# Patient Record
Sex: FEMALE | Race: WHITE | NOT HISPANIC OR LATINO | Employment: OTHER | URBAN - METROPOLITAN AREA
[De-identification: names, ages, dates, MRNs, and addresses within clinical notes are randomized per-mention and may not be internally consistent; named-entity substitution may affect disease eponyms.]

---

## 2020-09-28 ENCOUNTER — OFFICE VISIT (OUTPATIENT)
Dept: URGENT CARE | Facility: CLINIC | Age: 55
End: 2020-09-28
Payer: MEDICARE

## 2020-09-28 VITALS
RESPIRATION RATE: 16 BRPM | HEIGHT: 63 IN | WEIGHT: 159 LBS | HEART RATE: 88 BPM | BODY MASS INDEX: 28.17 KG/M2 | OXYGEN SATURATION: 97 % | DIASTOLIC BLOOD PRESSURE: 80 MMHG | SYSTOLIC BLOOD PRESSURE: 137 MMHG | TEMPERATURE: 97 F

## 2020-09-28 DIAGNOSIS — R51.9 NONINTRACTABLE HEADACHE, UNSPECIFIED CHRONICITY PATTERN, UNSPECIFIED HEADACHE TYPE: Primary | ICD-10-CM

## 2020-09-28 DIAGNOSIS — R11.0 NAUSEA: ICD-10-CM

## 2020-09-28 PROCEDURE — 99203 OFFICE O/P NEW LOW 30 MIN: CPT | Mod: 25,S$GLB,, | Performed by: NURSE PRACTITIONER

## 2020-09-28 PROCEDURE — 99203 PR OFFICE/OUTPT VISIT, NEW, LEVL III, 30-44 MIN: ICD-10-PCS | Mod: 25,S$GLB,, | Performed by: NURSE PRACTITIONER

## 2020-09-28 PROCEDURE — 96372 THER/PROPH/DIAG INJ SC/IM: CPT | Mod: S$GLB,,, | Performed by: FAMILY MEDICINE

## 2020-09-28 PROCEDURE — 96372 PR INJECTION,THERAP/PROPH/DIAG2ST, IM OR SUBCUT: ICD-10-PCS | Mod: S$GLB,,, | Performed by: FAMILY MEDICINE

## 2020-09-28 RX ORDER — ONDANSETRON 4 MG/1
4 TABLET, ORALLY DISINTEGRATING ORAL EVERY 6 HOURS PRN
Qty: 15 TABLET | Refills: 0 | Status: SHIPPED | OUTPATIENT
Start: 2020-09-28 | End: 2023-12-26 | Stop reason: CLARIF

## 2020-09-28 RX ORDER — KETOROLAC TROMETHAMINE 30 MG/ML
30 INJECTION, SOLUTION INTRAMUSCULAR; INTRAVENOUS
Status: COMPLETED | OUTPATIENT
Start: 2020-09-28 | End: 2020-09-28

## 2020-09-28 RX ORDER — ESCITALOPRAM OXALATE 10 MG/1
TABLET ORAL
COMMUNITY
Start: 2020-09-08 | End: 2023-09-26

## 2020-09-28 RX ORDER — TOPIRAMATE 50 MG/1
50 TABLET, FILM COATED ORAL 2 TIMES DAILY
COMMUNITY

## 2020-09-28 RX ORDER — ROSUVASTATIN CALCIUM 5 MG/1
5 TABLET, COATED ORAL
COMMUNITY

## 2020-09-28 RX ORDER — BUSPIRONE HYDROCHLORIDE 15 MG/1
TABLET ORAL
COMMUNITY
End: 2023-09-12

## 2020-09-28 RX ORDER — RIZATRIPTAN BENZOATE 10 MG/1
10 TABLET ORAL DAILY PRN
COMMUNITY
End: 2024-02-26

## 2020-09-28 RX ADMIN — KETOROLAC TROMETHAMINE 30 MG: 30 INJECTION, SOLUTION INTRAMUSCULAR; INTRAVENOUS at 08:09

## 2020-09-29 NOTE — PATIENT INSTRUCTIONS
"DO NOT TAKE ANY MORE NAPROXEN OR IBUPROFEN FOR 24 HOURS AS YOU RECEIVED A LARGE DOSE OF IT VIA INJECTION    PLEASE READ YOUR DISCHARGE INSTRUCTIONS ENTIRELY AS IT CONTAINS IMPORTANT INFORMATION.    Use the zofran as needed for nausea- it dissolves under your tongue.     Please go to the emergency room if you experience chest pain, shortness of breath, funny heart beats, headache, blurred vision, weakness in one arm or leg, slurred speech, numbness, inability to walk or talk, confusion.     Try to avoid common triggers of headaches (stress, menstruation, visual stimuli, weather changes, nitrates, fasting, wine, lack of sleep, smoking, odors, chocolate)       Please return or see your primary care doctor if you develop new or worsening symptoms.       Please arrange follow up with your primary medical clinic as soon as possible. You must understand that you've received an Urgent Care treatment only and that you may be released before all of your medical problems are known or treated. You, the patient, will arrange for follow up as instructed. If your symptoms worsen or fail to improve you should go to the Emergency Room.  WE CANNOT RULE OUT ALL POSSIBLE CAUSES OF YOUR SYMPTOMS IN THE URGENT CARE SETTING PLEASE GO TO THE ER IF YOU FEELS YOUR CONDITION IS WORSENING OR YOU WOULD LIKE EMERGENT EVALUATION.            Headache, Unspecified    A number of things can cause headaches. The cause of your headache isnt clear. But it doesnt seem to be a sign of any serious illness.  You could have a tension headache or a migraine headache.  Stress can cause a tension headache. This can happen if you tense the muscles of your shoulders, neck, and scalp without knowing it. If this stress lasts long enough, you may develop a tension headache.  It is not clear why migraines occur, but certain things called" triggers" can raise the risk of having a migraine attack. Migraine triggers may include emotional stress or depression, or by " hormone changes during the menstrual cycle. Other triggers include birth control pills and other medicines, alcohol or caffeine, foods with tyramine (such as aged cheese, wine), eyestrain, weather changes, missed meals, and lack of sleep or oversleeping.  Other causes of headache include:  · Viral illness with high fever  · Head injury with concussion  · Sinus, ear, or throat infection  · Dental pain and jaw joint (TMJ) pain  More serious but less common causes of headache include stroke, brain hemorrhage, brain tumor, meningitis, and encephalitis.  Home care  Follow these tips when taking care of yourself at home:  · Dont drive yourself home if you were given pain medicine for your headache. Instead, have someone else drive you home. Try to sleep when you get home. You should feel much better when you wake up.  · Apply heat to the back of your neck to ease a neck muscle spasm. Take care of a migraine headache by putting an ice pack on your forehead or at the base of your skull.  · If you have nausea or vomiting, eat a light diet until your headache eases.  · If you have a migraine headache, use sunglasses when in the daylight or around bright indoor lighting until your symptoms get better. Bright glaring light can make this type of headache worse.  Follow-up care  Follow up with your healthcare provider, or as advised. Talk with your provider if you have frequent headaches. He or she can help figure out a treatment plan. By knowing the earliest signs of headache, and starting treatment right away, you may be able to stop the pain yourself.  When to seek medical advice  Call your healthcare provider right away if any of these occur:  · Your head pain suddenly gets worse after sexual intercourse or strenuous activity  · Your head pain doesnt get better within 24 hours  · You arent able to keep liquids down (repeated vomiting)  · Fever of 100.4ºF (38ºC) or higher, or as directed by your healthcare provider  · Stiff  neck  · Extreme drowsiness, confusion, or fainting  · Dizziness or dizziness with spinning sensation (vertigo)  · Weakness in an arm or leg or one side of your face  · You have trouble talking or seeing  Date Last Reviewed: 8/1/2016  © 3036-5240 [employer]. [address] [place] [address]. All rights reserved. This information is not intended as a substitute for professional medical care. Always follow your healthcare professional's instructions.

## 2020-09-29 NOTE — PROGRESS NOTES
"Subjective:       Patient ID: Rebeca Martinez is a 55 y.o. female.    Vitals:  height is 5' 3" (1.6 m) and weight is 72.1 kg (159 lb). Her temperature is 97.2 °F (36.2 °C). Her blood pressure is 137/80 and her pulse is 88. Her respiration is 16 and oxygen saturation is 97%.     Chief Complaint: Migraine    This is a 55 y.o. female who presents today with a chief complaint of  migraine headache since this AM, patient here with mom, mom reports she give her regular migraine medication at 5:00 p.m. with no relief, mom reports usually after she takes her migraine medication it resolves her headache, denies any other symptoms, mom denies any pertinent medical history for patient, denies cardiac, or renal history, mom reports her primary at , mom/dad reports she used to follow-up with the neurology and they told her not to give her COVID but lately she has been drinking some Coke, mom reports nausea and requesting nausea medicine, denies vomiting, denies abdominal pain, denies chest pain or exertional chest pain, mom denies dizziness or positional lightheadedness, mom reports usual migraine headache, denies worst headache, mom does not remember exact name of the migraine medication, upon chart review Topamax listed            Migraine   This is a new problem. The current episode started today. The problem occurs constantly. The problem has been gradually worsening. The pain is severe. Associated symptoms include nausea. Pertinent negatives include no blurred vision, coughing, dizziness, fever, sore throat or weakness. Treatments tried: migraine meds. The treatment provided no relief.       Constitution: Negative for chills, fatigue and fever.   HENT: Negative for congestion and sore throat.    Neck: Negative for painful lymph nodes.   Cardiovascular: Negative for chest pain and leg swelling.   Eyes: Negative for double vision and blurred vision.   Respiratory: Negative for cough and shortness of breath.  "   Gastrointestinal: Positive for nausea. Negative for diarrhea.   Genitourinary: Negative for dysuria, frequency, urgency and history of kidney stones.   Musculoskeletal: Negative for joint pain, joint swelling, muscle cramps and muscle ache.   Skin: Negative for color change, pale, rash and bruising.   Allergic/Immunologic: Negative for seasonal allergies.   Neurological: Positive for headaches. Negative for dizziness, history of vertigo, light-headedness and passing out.   Hematologic/Lymphatic: Negative for swollen lymph nodes.   Psychiatric/Behavioral: Negative for nervous/anxious, sleep disturbance and depression. The patient is not nervous/anxious.        Objective:      Physical Exam   Constitutional: She is oriented to person, place, and time. She appears well-developed.  Non-toxic appearance. She does not appear ill. No distress.   HENT:   Head: Normocephalic and atraumatic.   Ears:   Right Ear: Hearing, tympanic membrane, external ear and ear canal normal.   Left Ear: Hearing, tympanic membrane, external ear and ear canal normal.   Nose: Nose normal. No mucosal edema, rhinorrhea or nasal deformity. No epistaxis. Right sinus exhibits no maxillary sinus tenderness and no frontal sinus tenderness. Left sinus exhibits no maxillary sinus tenderness and no frontal sinus tenderness.   Mouth/Throat: Uvula is midline, oropharynx is clear and moist and mucous membranes are normal. No trismus in the jaw. Normal dentition. No uvula swelling. No posterior oropharyngeal erythema.   No temp TTP      Comments: No temp TTP  Eyes: Pupils are equal, round, and reactive to light. Conjunctivae, EOM and lids are normal. No scleral icterus.   Neck: Trachea normal, normal range of motion, full passive range of motion without pain and phonation normal. Neck supple. No neck rigidity.   Cardiovascular: Normal rate, regular rhythm, normal heart sounds and normal pulses.   Pulmonary/Chest: Effort normal and breath sounds normal. No  respiratory distress.   Abdominal: Soft. Normal appearance and bowel sounds are normal. She exhibits no distension. There is no abdominal tenderness.   Musculoskeletal: Normal range of motion.         General: No deformity.   Neurological: She is alert and oriented to person, place, and time. She has intact cranial nerves. No cranial nerve deficit. She exhibits normal muscle tone. Coordination normal.   Skin: Skin is warm, dry, intact, not diaphoretic and not pale. Psychiatric: Her speech is normal and behavior is normal. Judgment and thought content normal.   Nursing note and vitals reviewed.          Patient in no acute distress and nontoxic appearing.  Vitals reassuring.  Detailed education provided to parents regarding  headaches. Side effects of Toradol discussed in detail. Strict ER precautions given.    Discussed results/diagnosis/plan in depth with patient in clinic. Strict precautions given to patient to monitor for worsening signs and symptoms. Advised to follow up with primary.All questions answered. Strict ER precautions given. If your symptoms worsens of fail to improve you should go to the Emergency Room. Discharge and follow-up instructions given verbally/printed. Patient voiced understanding and in agreement with current treatment plan.        Assessment:       1. Nonintractable headache, unspecified chronicity pattern, unspecified headache type    2. Nausea        Plan:         Nonintractable headache, unspecified chronicity pattern, unspecified headache type  -     ketorolac injection 30 mg    Nausea  -     ondansetron (ZOFRAN-ODT) 4 MG TbDL; Take 1 tablet (4 mg total) by mouth every 6 (six) hours as needed.  Dispense: 15 tablet; Refill: 0      Patient Instructions   DO NOT TAKE ANY MORE NAPROXEN OR IBUPROFEN FOR 24 HOURS AS YOU RECEIVED A LARGE DOSE OF IT VIA INJECTION    PLEASE READ YOUR DISCHARGE INSTRUCTIONS ENTIRELY AS IT CONTAINS IMPORTANT INFORMATION.    Use the zofran as needed for nausea-  "it dissolves under your tongue.     Please go to the emergency room if you experience chest pain, shortness of breath, funny heart beats, headache, blurred vision, weakness in one arm or leg, slurred speech, numbness, inability to walk or talk, confusion.     Try to avoid common triggers of headaches (stress, menstruation, visual stimuli, weather changes, nitrates, fasting, wine, lack of sleep, smoking, odors, chocolate)       Please return or see your primary care doctor if you develop new or worsening symptoms.       Please arrange follow up with your primary medical clinic as soon as possible. You must understand that you've received an Urgent Care treatment only and that you may be released before all of your medical problems are known or treated. You, the patient, will arrange for follow up as instructed. If your symptoms worsen or fail to improve you should go to the Emergency Room.  WE CANNOT RULE OUT ALL POSSIBLE CAUSES OF YOUR SYMPTOMS IN THE URGENT CARE SETTING PLEASE GO TO THE ER IF YOU FEELS YOUR CONDITION IS WORSENING OR YOU WOULD LIKE EMERGENT EVALUATION.            Headache, Unspecified    A number of things can cause headaches. The cause of your headache isnt clear. But it doesnt seem to be a sign of any serious illness.  You could have a tension headache or a migraine headache.  Stress can cause a tension headache. This can happen if you tense the muscles of your shoulders, neck, and scalp without knowing it. If this stress lasts long enough, you may develop a tension headache.  It is not clear why migraines occur, but certain things called" triggers" can raise the risk of having a migraine attack. Migraine triggers may include emotional stress or depression, or by hormone changes during the menstrual cycle. Other triggers include birth control pills and other medicines, alcohol or caffeine, foods with tyramine (such as aged cheese, wine), eyestrain, weather changes, missed meals, and lack of " sleep or oversleeping.  Other causes of headache include:  · Viral illness with high fever  · Head injury with concussion  · Sinus, ear, or throat infection  · Dental pain and jaw joint (TMJ) pain  More serious but less common causes of headache include stroke, brain hemorrhage, brain tumor, meningitis, and encephalitis.  Home care  Follow these tips when taking care of yourself at home:  · Dont drive yourself home if you were given pain medicine for your headache. Instead, have someone else drive you home. Try to sleep when you get home. You should feel much better when you wake up.  · Apply heat to the back of your neck to ease a neck muscle spasm. Take care of a migraine headache by putting an ice pack on your forehead or at the base of your skull.  · If you have nausea or vomiting, eat a light diet until your headache eases.  · If you have a migraine headache, use sunglasses when in the daylight or around bright indoor lighting until your symptoms get better. Bright glaring light can make this type of headache worse.  Follow-up care  Follow up with your healthcare provider, or as advised. Talk with your provider if you have frequent headaches. He or she can help figure out a treatment plan. By knowing the earliest signs of headache, and starting treatment right away, you may be able to stop the pain yourself.  When to seek medical advice  Call your healthcare provider right away if any of these occur:  · Your head pain suddenly gets worse after sexual intercourse or strenuous activity  · Your head pain doesnt get better within 24 hours  · You arent able to keep liquids down (repeated vomiting)  · Fever of 100.4ºF (38ºC) or higher, or as directed by your healthcare provider  · Stiff neck  · Extreme drowsiness, confusion, or fainting  · Dizziness or dizziness with spinning sensation (vertigo)  · Weakness in an arm or leg or one side of your face  · You have trouble talking or seeing  Date Last Reviewed:  8/1/2016  © 7661-6353 [employer], [place]. [address] [place] [address], [place] [address]. All rights reserved. This information is not intended as a substitute for professional medical care. Always follow your healthcare professional's instructions.

## 2020-11-16 ENCOUNTER — OFFICE VISIT (OUTPATIENT)
Dept: URGENT CARE | Facility: CLINIC | Age: 55
End: 2020-11-16
Payer: MEDICARE

## 2020-11-16 VITALS
TEMPERATURE: 97 F | BODY MASS INDEX: 29.23 KG/M2 | WEIGHT: 165 LBS | HEIGHT: 63 IN | DIASTOLIC BLOOD PRESSURE: 89 MMHG | OXYGEN SATURATION: 98 % | SYSTOLIC BLOOD PRESSURE: 148 MMHG | HEART RATE: 64 BPM

## 2020-11-16 DIAGNOSIS — R51.9 NONINTRACTABLE HEADACHE, UNSPECIFIED CHRONICITY PATTERN, UNSPECIFIED HEADACHE TYPE: Primary | ICD-10-CM

## 2020-11-16 PROCEDURE — 99213 PR OFFICE/OUTPT VISIT, EST, LEVL III, 20-29 MIN: ICD-10-PCS | Mod: 25,S$GLB,, | Performed by: NURSE PRACTITIONER

## 2020-11-16 PROCEDURE — 96372 THER/PROPH/DIAG INJ SC/IM: CPT | Mod: S$GLB,,, | Performed by: NURSE PRACTITIONER

## 2020-11-16 PROCEDURE — 99213 OFFICE O/P EST LOW 20 MIN: CPT | Mod: 25,S$GLB,, | Performed by: NURSE PRACTITIONER

## 2020-11-16 PROCEDURE — 96372 PR INJECTION,THERAP/PROPH/DIAG2ST, IM OR SUBCUT: ICD-10-PCS | Mod: S$GLB,,, | Performed by: NURSE PRACTITIONER

## 2020-11-16 RX ORDER — KETOROLAC TROMETHAMINE 30 MG/ML
30 INJECTION, SOLUTION INTRAMUSCULAR; INTRAVENOUS
Status: COMPLETED | OUTPATIENT
Start: 2020-11-16 | End: 2020-11-16

## 2020-11-16 RX ADMIN — KETOROLAC TROMETHAMINE 30 MG: 30 INJECTION, SOLUTION INTRAMUSCULAR; INTRAVENOUS at 05:11

## 2020-11-16 NOTE — PROGRESS NOTES
"Subjective:       Patient ID: Rebeca Martinez is a 55 y.o. female.    Vitals:  height is 5' 3" (1.6 m) and weight is 74.8 kg (165 lb). Her temperature is 97.4 °F (36.3 °C). Her blood pressure is 148/89 (abnormal) and her pulse is 64. Her oxygen saturation is 98%.     Chief Complaint: Migraine    Pt states migraine beginning on 11/15.     Migraine   This is a new problem. The current episode started today. The problem occurs constantly. The problem has been gradually worsening. The pain is located in the bilateral region. The pain does not radiate. The pain quality is similar to prior headaches. The quality of the pain is described as stabbing, shooting and pulsating. The pain is at a severity of 7/10. Pertinent negatives include no abdominal pain, abnormal behavior, anorexia, back pain, blurred vision, coughing, dizziness, drainage, ear pain, eye pain, eye redness, eye watering, facial sweating, fever, hearing loss, insomnia, loss of balance, muscle aches, nausea, neck pain, numbness, phonophobia, photophobia, rhinorrhea, scalp tenderness, seizures, sinus pressure, sore throat, swollen glands, tingling, tinnitus, visual change, vomiting, weakness or weight loss. The symptoms are aggravated by bright light. She has tried NSAIDs for the symptoms. The treatment provided no relief. There is no history of cancer, cluster headaches, hypertension, immunosuppression, migraine headaches, migraines in the family, obesity, pseudotumor cerebri, recent head traumas, sinus disease or TMJ.       Constitution: Negative for chills, sweating and fever.   HENT: Negative for ear pain, tinnitus, hearing loss, facial swelling, congestion, sinus pain, sinus pressure and sore throat.    Neck: Negative for neck pain and neck stiffness.   Eyes: Negative for eye pain, eye redness, photophobia, vision loss, double vision and blurred vision.   Respiratory: Negative for cough.    Gastrointestinal: Negative for abdominal pain, nausea and " vomiting.   Genitourinary: Negative for missed menses.   Musculoskeletal: Negative for trauma, back pain and muscle ache.   Skin: Negative for rash, wound and lesion.   Neurological: Positive for headaches. Negative for dizziness, history of vertigo, light-headedness, facial drooping, speech difficulty, coordination disturbances, loss of balance, history of migraines, disorientation, loss of consciousness, numbness and seizures.   Psychiatric/Behavioral: Negative for disorientation, confusion, nervous/anxious, sleep disturbance and depression. The patient is not nervous/anxious and does not have insomnia.        Objective:      Physical Exam   Constitutional: normal  HENT:   Head: Normocephalic.   Ears:   Right Ear: External ear normal.   Left Ear: External ear normal.   Mouth/Throat:      Comments: Oropharyngeal exam not performed due to risk of viral transmission during global pandemic-- risks outweigh benefits of exam  No temporal tenderness      Comments: No temporal tenderness  Pulmonary/Chest: Effort normal.   Abdominal: Normal appearance.   Neurological: She is alert.   Nursing note and vitals reviewed.        Assessment:       1. Nonintractable headache, unspecified chronicity pattern, unspecified headache type        Plan:         Nonintractable headache, unspecified chronicity pattern, unspecified headache type  -     ketorolac injection 30 mg  -     Ambulatory referral/consult to Neurology         Patient Instructions                                                       Headache   If your condition worsens or fails to improve we recommend that you receive another evaluation at the ER immediately or contact your PCP to discuss your concerns or return here. You must understand that you've received an urgent care treatment only and that you may be released before all your medical problems are known or treated. You the patient will arrange for follouwp care as instructed.   If you were given narcotic  prescriptions or muscle relaxants do not operate heavy equipment or machinery while taking these medications   Get rest and drink fluids. Get plenty of rest.  Watch for increase pain, fever, vomiting, neck pain or mental confusion.   You can also use tylenol or ibuprofen as directed as long as you don't have any allergies to these medications or medical conditions such as ulcers, liver or kidney disease or blood thinners etc that would prevent you from taking these meds.     You were given Toradol 30mg IM injection on today.  DO NOT START TAKING YOUR NAPROSYN until tomorrow,   Since you were given a prescription NSAID here do not also take any over the counter NSAID like ibuprofen, aleve, advil, motrin etc

## 2020-11-16 NOTE — PATIENT INSTRUCTIONS
Headache   If your condition worsens or fails to improve we recommend that you receive another evaluation at the ER immediately or contact your PCP to discuss your concerns or return here. You must understand that you've received an urgent care treatment only and that you may be released before all your medical problems are known or treated. You the patient will arrange for follouwp care as instructed.   If you were given narcotic prescriptions or muscle relaxants do not operate heavy equipment or machinery while taking these medications   Get rest and drink fluids. Get plenty of rest.  Watch for increase pain, fever, vomiting, neck pain or mental confusion.   You can also use tylenol or ibuprofen as directed as long as you don't have any allergies to these medications or medical conditions such as ulcers, liver or kidney disease or blood thinners etc that would prevent you from taking these meds.     You were given Toradol 30mg IM injection on today.  DO NOT START TAKING YOUR NAPROSYN until tomorrow,   Since you were given a prescription NSAID here do not also take any over the counter NSAID like ibuprofen, aleve, advil, motrin etc

## 2020-11-25 ENCOUNTER — TELEPHONE (OUTPATIENT)
Dept: NEUROLOGY | Facility: CLINIC | Age: 55
End: 2020-11-25

## 2021-04-20 ENCOUNTER — OFFICE VISIT (OUTPATIENT)
Dept: URGENT CARE | Facility: CLINIC | Age: 56
End: 2021-04-20
Payer: MEDICARE

## 2021-04-20 VITALS
OXYGEN SATURATION: 98 % | RESPIRATION RATE: 14 BRPM | BODY MASS INDEX: 29.23 KG/M2 | HEIGHT: 63 IN | HEART RATE: 76 BPM | WEIGHT: 165 LBS | SYSTOLIC BLOOD PRESSURE: 136 MMHG | DIASTOLIC BLOOD PRESSURE: 85 MMHG | TEMPERATURE: 97 F

## 2021-04-20 DIAGNOSIS — G43.901 MIGRAINE WITH STATUS MIGRAINOSUS, NOT INTRACTABLE, UNSPECIFIED MIGRAINE TYPE: Primary | ICD-10-CM

## 2021-04-20 PROCEDURE — 96372 PR INJECTION,THERAP/PROPH/DIAG2ST, IM OR SUBCUT: ICD-10-PCS | Mod: S$GLB,,, | Performed by: STUDENT IN AN ORGANIZED HEALTH CARE EDUCATION/TRAINING PROGRAM

## 2021-04-20 PROCEDURE — 99214 OFFICE O/P EST MOD 30 MIN: CPT | Mod: 25,S$GLB,, | Performed by: STUDENT IN AN ORGANIZED HEALTH CARE EDUCATION/TRAINING PROGRAM

## 2021-04-20 PROCEDURE — 99214 PR OFFICE/OUTPT VISIT, EST, LEVL IV, 30-39 MIN: ICD-10-PCS | Mod: 25,S$GLB,, | Performed by: STUDENT IN AN ORGANIZED HEALTH CARE EDUCATION/TRAINING PROGRAM

## 2021-04-20 PROCEDURE — 96372 THER/PROPH/DIAG INJ SC/IM: CPT | Mod: S$GLB,,, | Performed by: STUDENT IN AN ORGANIZED HEALTH CARE EDUCATION/TRAINING PROGRAM

## 2021-04-20 RX ORDER — ONDANSETRON 4 MG/1
4 TABLET, ORALLY DISINTEGRATING ORAL
Status: COMPLETED | OUTPATIENT
Start: 2021-04-20 | End: 2021-04-20

## 2021-04-20 RX ORDER — KETOROLAC TROMETHAMINE 30 MG/ML
30 INJECTION, SOLUTION INTRAMUSCULAR; INTRAVENOUS
Status: COMPLETED | OUTPATIENT
Start: 2021-04-20 | End: 2021-04-20

## 2021-04-20 RX ADMIN — KETOROLAC TROMETHAMINE 30 MG: 30 INJECTION, SOLUTION INTRAMUSCULAR; INTRAVENOUS at 01:04

## 2021-04-20 RX ADMIN — ONDANSETRON 4 MG: 4 TABLET, ORALLY DISINTEGRATING ORAL at 01:04

## 2023-06-15 ENCOUNTER — OFFICE VISIT (OUTPATIENT)
Dept: OTOLARYNGOLOGY | Facility: CLINIC | Age: 58
End: 2023-06-15
Payer: MEDICARE

## 2023-06-15 VITALS — WEIGHT: 195.75 LBS | BODY MASS INDEX: 34.68 KG/M2 | HEIGHT: 63 IN

## 2023-06-15 DIAGNOSIS — H61.23 BILATERAL IMPACTED CERUMEN: Primary | ICD-10-CM

## 2023-06-15 PROCEDURE — 99999 PR PBB SHADOW E&M-EST. PATIENT-LVL III: CPT | Mod: PBBFAC,,, | Performed by: STUDENT IN AN ORGANIZED HEALTH CARE EDUCATION/TRAINING PROGRAM

## 2023-06-15 PROCEDURE — 69210 PR REMOVAL IMPACTED CERUMEN REQUIRING INSTRUMENTATION, UNILATERAL: ICD-10-PCS | Mod: S$PBB,,, | Performed by: STUDENT IN AN ORGANIZED HEALTH CARE EDUCATION/TRAINING PROGRAM

## 2023-06-15 PROCEDURE — 99499 UNLISTED E&M SERVICE: CPT | Mod: S$PBB,,, | Performed by: STUDENT IN AN ORGANIZED HEALTH CARE EDUCATION/TRAINING PROGRAM

## 2023-06-15 PROCEDURE — 99999 PR PBB SHADOW E&M-EST. PATIENT-LVL III: ICD-10-PCS | Mod: PBBFAC,,, | Performed by: STUDENT IN AN ORGANIZED HEALTH CARE EDUCATION/TRAINING PROGRAM

## 2023-06-15 PROCEDURE — 99213 OFFICE O/P EST LOW 20 MIN: CPT | Mod: PBBFAC,PO | Performed by: STUDENT IN AN ORGANIZED HEALTH CARE EDUCATION/TRAINING PROGRAM

## 2023-06-15 PROCEDURE — 69210 REMOVE IMPACTED EAR WAX UNI: CPT | Mod: PBBFAC,PO | Performed by: STUDENT IN AN ORGANIZED HEALTH CARE EDUCATION/TRAINING PROGRAM

## 2023-06-15 PROCEDURE — 99499 NO LOS: ICD-10-PCS | Mod: S$PBB,,, | Performed by: STUDENT IN AN ORGANIZED HEALTH CARE EDUCATION/TRAINING PROGRAM

## 2023-06-15 PROCEDURE — 69210 REMOVE IMPACTED EAR WAX UNI: CPT | Mod: S$PBB,,, | Performed by: STUDENT IN AN ORGANIZED HEALTH CARE EDUCATION/TRAINING PROGRAM

## 2023-06-15 RX ORDER — NEOMYCIN SULFATE, POLYMYXIN B SULFATE AND HYDROCORTISONE 10; 3.5; 1 MG/ML; MG/ML; [USP'U]/ML
3 SUSPENSION/ DROPS AURICULAR (OTIC) 3 TIMES DAILY
COMMUNITY
Start: 2023-01-06 | End: 2023-11-16 | Stop reason: SDUPTHER

## 2023-06-15 RX ORDER — TERBINAFINE HYDROCHLORIDE 250 MG/1
250 TABLET ORAL
COMMUNITY
Start: 2023-04-04 | End: 2023-12-26 | Stop reason: CLARIF

## 2023-06-15 RX ORDER — OXYCODONE AND ACETAMINOPHEN 5; 325 MG/1; MG/1
1 TABLET ORAL EVERY 6 HOURS PRN
COMMUNITY
Start: 2023-03-28 | End: 2023-12-26 | Stop reason: CLARIF

## 2023-06-15 RX ORDER — ALPRAZOLAM 0.5 MG/1
0.5 TABLET ORAL 2 TIMES DAILY
COMMUNITY
Start: 2023-03-29 | End: 2023-09-12

## 2023-06-15 RX ORDER — ASCORBIC ACID 250 MG
250 TABLET ORAL 2 TIMES DAILY
COMMUNITY

## 2023-06-15 NOTE — PROGRESS NOTES
Otolaryngology Clinic Note    Subjective:       Patient ID: Rebeca Martinez is a 58 y.o. female.    Chief Complaint: Cerumen Impaction (Left ear)      History of Present Illness: Rebeca Martinez is a 58 y.o. female presenting with ear wax concerns. Usually gets irrigated every 3-4 weeks by ent on [place] but moving to [place]. No preventative measures at home. Chronic wax issues. Only reason here for today.       Past Surgical History:   Procedure Laterality Date    APPENDECTOMY       No past medical history on file.  Social Determinants of Health     Tobacco Use: Low Risk     Smoking Tobacco Use: Never    Smokeless Tobacco Use: Never    Passive Exposure: Not on file   Alcohol Use: Not on file   Financial Resource Strain: Not on file   Food Insecurity: Not on file   Transportation Needs: Not on file   Physical Activity: Not on file   Stress: Not on file   Social Connections: Not on file   Housing Stability: Not on file   Depression: Not on file     Review of patient's allergies indicates:  No Known Allergies  Current Outpatient Medications   Medication Instructions    ALPRAZolam (XANAX) 0.5 mg, Oral, 2 times daily    ascorbic acid (vitamin C) (VITAMIN C) 250 mg, Oral    busPIRone (BUSPAR) 15 MG tablet buspirone 15 mg tablet    escitalopram oxalate (LEXAPRO) 10 MG tablet No dose, route, or frequency recorded.    multivitamin (THERAGRAN) per tablet 1 tablet, Oral    neomycin-polymyxin-hydrocortisone (CORTISPORIN) 3.5-10,000-1 mg/mL-unit/mL-% otic suspension 3 drops, Both Ears, 3 times daily    ondansetron (ZOFRAN-ODT) 4 mg, Oral, Every 6 hours PRN    oxyCODONE-acetaminophen (PERCOCET) 5-325 mg per tablet 1 tablet, Oral, Every 6 hours PRN    rizatriptan (MAXALT) 10 MG tablet rizatriptan 10 mg tablet    rosuvastatin (CRESTOR) 5 MG tablet rosuvastatin 5 mg tablet    terbinafine HCL (LAMISIL) 250 mg, Oral    topiramate (TOPAMAX) 50 MG tablet topiramate 50 mg tablet         ENT ROS negative except as stated above.      Patient answers are not available for this visit.            Objective:      There were no vitals filed for this visit.    General: NAD, well appearing, syndromic  Eyes: Normal conjunctiva and lids  Face: symmetric, nerve intact  Nose: The nose is without any evidence of any deformity. The nasal mucosa is moist. The septum is midline. There is no evidence of septal hematoma. The turbinates are without abnormality.   Ears: The ears are with normal-appearing pinna. Examination of the canals is normal appearing bilaterally after wax, narrow AP. There is no drainage or erythema noted. The tympanic membranes are normal appearing with pearly color, normal-appearing landmarks and normal light reflex. Hearing is grossly intact.  Mouth: No obvious abnormalities to the lips. The teeth are unremarkable. The gingivae are without any obvious evidence of infection or lesion. The oral mucosa is moist and pink. There are no obvious masses to the hard or soft palate.   Oropharynx: The uvula is midline.  The tongue is midline. The posterior pharynx is without erythema or exudate. The tonsils are normal appearing.  Salivary glands: The salivary glands are symmetric and not enlarged, no masses  Neck: No lymphadenopathy, trachea midline, thryoid not enlarged.  Psych: Normal mood and affect.   Neuro: Grossly intact  Speech: fluent    Procedure:   Cerumen impaction removal  Indications: Cerumen impaction  Verbal consent obtained.   Under microscope, wax was removed from right and left ear using combination of suction, hook, curette instrumentation.   Patient tolerated procedure well.        Assessment and Plan:       1. Bilateral impacted cerumen          Wax removed today  Instructed on ear cleaning at home- peroxide with a little rubbing alcohol or mineral oil once or twice a week before shower/bath. 1 cc syringes given to use at home.     RTC: 3 months with Sadie for recheck/cleaning    Plan of care was discussed in detail with  the patient, who agreed with the plan as above. All questions were answered in detail.     Annabella Price MD  Otolaryngology

## 2023-06-15 NOTE — PATIENT INSTRUCTIONS
Using syringe or eye dropper peroxide with a dash of rubbing alcohol or straight mineral oil/olive oil, etc, place a few drops in each ear 1-2 times a week before shower or bath.

## 2023-07-12 ENCOUNTER — OFFICE VISIT (OUTPATIENT)
Dept: FAMILY MEDICINE | Facility: CLINIC | Age: 58
End: 2023-07-12
Payer: MEDICARE

## 2023-07-12 ENCOUNTER — LAB VISIT (OUTPATIENT)
Dept: LAB | Facility: HOSPITAL | Age: 58
End: 2023-07-12
Attending: INTERNAL MEDICINE
Payer: MEDICARE

## 2023-07-12 VITALS
OXYGEN SATURATION: 98 % | WEIGHT: 191.81 LBS | DIASTOLIC BLOOD PRESSURE: 84 MMHG | SYSTOLIC BLOOD PRESSURE: 138 MMHG | HEART RATE: 92 BPM | BODY MASS INDEX: 33.98 KG/M2 | HEIGHT: 63 IN

## 2023-07-12 DIAGNOSIS — K76.0 FATTY LIVER: ICD-10-CM

## 2023-07-12 DIAGNOSIS — R73.09 ABNORMAL BLOOD SUGAR: Primary | ICD-10-CM

## 2023-07-12 DIAGNOSIS — E78.9 LIPID DISORDER: ICD-10-CM

## 2023-07-12 DIAGNOSIS — R53.83 FATIGUE, UNSPECIFIED TYPE: ICD-10-CM

## 2023-07-12 DIAGNOSIS — R73.09 ABNORMAL BLOOD SUGAR: ICD-10-CM

## 2023-07-12 LAB
ALBUMIN SERPL BCP-MCNC: 3.9 G/DL (ref 3.5–5.2)
ALP SERPL-CCNC: 90 U/L (ref 55–135)
ALT SERPL W/O P-5'-P-CCNC: 28 U/L (ref 10–44)
ANION GAP SERPL CALC-SCNC: 9 MMOL/L (ref 8–16)
AST SERPL-CCNC: 25 U/L (ref 10–40)
BASOPHILS # BLD AUTO: 0.06 K/UL (ref 0–0.2)
BASOPHILS NFR BLD: 0.8 % (ref 0–1.9)
BILIRUB SERPL-MCNC: 0.3 MG/DL (ref 0.1–1)
BUN SERPL-MCNC: 15 MG/DL (ref 6–20)
CALCIUM SERPL-MCNC: 9.3 MG/DL (ref 8.7–10.5)
CHLORIDE SERPL-SCNC: 110 MMOL/L (ref 95–110)
CO2 SERPL-SCNC: 21 MMOL/L (ref 23–29)
CREAT SERPL-MCNC: 1 MG/DL (ref 0.5–1.4)
DIFFERENTIAL METHOD: ABNORMAL
EOSINOPHIL # BLD AUTO: 0.3 K/UL (ref 0–0.5)
EOSINOPHIL NFR BLD: 3.3 % (ref 0–8)
ERYTHROCYTE [DISTWIDTH] IN BLOOD BY AUTOMATED COUNT: 14 % (ref 11.5–14.5)
EST. GFR  (NO RACE VARIABLE): >60 ML/MIN/1.73 M^2
ESTIMATED AVG GLUCOSE: 111 MG/DL (ref 68–131)
GLUCOSE SERPL-MCNC: 100 MG/DL (ref 70–110)
HBA1C MFR BLD: 5.5 % (ref 4–5.6)
HCT VFR BLD AUTO: 44.4 % (ref 37–48.5)
HGB BLD-MCNC: 14.1 G/DL (ref 12–16)
IMM GRANULOCYTES # BLD AUTO: 0.05 K/UL (ref 0–0.04)
IMM GRANULOCYTES NFR BLD AUTO: 0.6 % (ref 0–0.5)
LYMPHOCYTES # BLD AUTO: 2.6 K/UL (ref 1–4.8)
LYMPHOCYTES NFR BLD: 33.4 % (ref 18–48)
MCH RBC QN AUTO: 28 PG (ref 27–31)
MCHC RBC AUTO-ENTMCNC: 31.8 G/DL (ref 32–36)
MCV RBC AUTO: 88 FL (ref 82–98)
MONOCYTES # BLD AUTO: 0.7 K/UL (ref 0.3–1)
MONOCYTES NFR BLD: 8.3 % (ref 4–15)
NEUTROPHILS # BLD AUTO: 4.2 K/UL (ref 1.8–7.7)
NEUTROPHILS NFR BLD: 53.6 % (ref 38–73)
NRBC BLD-RTO: 0 /100 WBC
PLATELET # BLD AUTO: 208 K/UL (ref 150–450)
PMV BLD AUTO: 13.2 FL (ref 9.2–12.9)
POTASSIUM SERPL-SCNC: 3.9 MMOL/L (ref 3.5–5.1)
PROT SERPL-MCNC: 7.7 G/DL (ref 6–8.4)
RBC # BLD AUTO: 5.03 M/UL (ref 4–5.4)
SODIUM SERPL-SCNC: 140 MMOL/L (ref 136–145)
TSH SERPL DL<=0.005 MIU/L-ACNC: 2.05 UIU/ML (ref 0.4–4)
WBC # BLD AUTO: 7.91 K/UL (ref 3.9–12.7)

## 2023-07-12 PROCEDURE — 80053 COMPREHEN METABOLIC PANEL: CPT | Performed by: INTERNAL MEDICINE

## 2023-07-12 PROCEDURE — 99204 OFFICE O/P NEW MOD 45 MIN: CPT | Mod: S$PBB,,, | Performed by: INTERNAL MEDICINE

## 2023-07-12 PROCEDURE — 99999 PR PBB SHADOW E&M-EST. PATIENT-LVL IV: CPT | Mod: PBBFAC,,, | Performed by: INTERNAL MEDICINE

## 2023-07-12 PROCEDURE — 83036 HEMOGLOBIN GLYCOSYLATED A1C: CPT | Performed by: INTERNAL MEDICINE

## 2023-07-12 PROCEDURE — 99999 PR PBB SHADOW E&M-EST. PATIENT-LVL IV: ICD-10-PCS | Mod: PBBFAC,,, | Performed by: INTERNAL MEDICINE

## 2023-07-12 PROCEDURE — 85025 COMPLETE CBC W/AUTO DIFF WBC: CPT | Performed by: INTERNAL MEDICINE

## 2023-07-12 PROCEDURE — 84443 ASSAY THYROID STIM HORMONE: CPT | Performed by: INTERNAL MEDICINE

## 2023-07-12 PROCEDURE — 99204 PR OFFICE/OUTPT VISIT, NEW, LEVL IV, 45-59 MIN: ICD-10-PCS | Mod: S$PBB,,, | Performed by: INTERNAL MEDICINE

## 2023-07-12 PROCEDURE — 99214 OFFICE O/P EST MOD 30 MIN: CPT | Mod: PBBFAC,PO | Performed by: INTERNAL MEDICINE

## 2023-07-12 PROCEDURE — 36415 COLL VENOUS BLD VENIPUNCTURE: CPT | Mod: PO | Performed by: INTERNAL MEDICINE

## 2023-07-12 RX ORDER — BUTALBITAL, ACETAMINOPHEN AND CAFFEINE 50; 325; 40 MG/1; MG/1; MG/1
1 TABLET ORAL EVERY 6 HOURS PRN
Qty: 40 TABLET | Refills: 3 | Status: SHIPPED | OUTPATIENT
Start: 2023-07-12 | End: 2023-08-11

## 2023-07-12 NOTE — PROGRESS NOTES
Subjective     Patient ID: Rebeca Martinez is a 58 y.o. female.    Chief Complaint: Establish Care    Pt with special needs here to get established.    Depression- uncontrolled.  Sister says she is not sure what she is taking but has moments of anger and outbursts.  Recently moved and under a lot of stress    Chronic headaches- not sure if migraines. Has maxalt but not taking. Sometimes a cup of coffee helps    Chroinc pain- has arthritis, taking adivil prn    Hyperilpidemia- on crestor         Review of Systems   Constitutional:  Negative for fever.   Respiratory:  Negative for shortness of breath.    Cardiovascular:  Negative for chest pain.   Neurological:  Negative for headaches.        Objective     Physical Exam  Constitutional:       Appearance: Normal appearance.   HENT:      Head: Normocephalic.   Cardiovascular:      Rate and Rhythm: Normal rate and regular rhythm.   Pulmonary:      Effort: Pulmonary effort is normal.      Breath sounds: Normal breath sounds.   Musculoskeletal:         General: Normal range of motion.      Cervical back: Normal range of motion.   Neurological:      General: No focal deficit present.      Mental Status: She is alert.   Psychiatric:         Mood and Affect: Mood normal.         Behavior: Behavior normal.          Assessment and Plan     1. Abnormal blood sugar  -     CBC Auto Differential; Future; Expected date: 07/12/2023  -     Comprehensive Metabolic Panel; Future; Expected date: 07/12/2023  -     TSH; Future; Expected date: 07/12/2023  -     Hemoglobin A1C; Future; Expected date: 07/12/2023    2. Lipid disorder  -     CBC Auto Differential; Future; Expected date: 07/12/2023  -     Comprehensive Metabolic Panel; Future; Expected date: 07/12/2023  -     TSH; Future; Expected date: 07/12/2023  -     Hemoglobin A1C; Future; Expected date: 07/12/2023    3. Fatty liver  -     CBC Auto Differential; Future; Expected date: 07/12/2023  -     Comprehensive Metabolic Panel;  Future; Expected date: 07/12/2023  -     TSH; Future; Expected date: 07/12/2023  -     Hemoglobin A1C; Future; Expected date: 07/12/2023    4. Fatigue, unspecified type  -     TSH; Future; Expected date: 07/12/2023    Other orders  -     butalbital-acetaminophen-caffeine -40 mg (FIORICET, ESGIC) -40 mg per tablet; Take 1 tablet by mouth every 6 (six) hours as needed for Pain or Headaches.  Dispense: 40 tablet; Refill: 3        Depression- sister to send me list of her meds so I can adjust. Might try snri to help with chronic pain    Fatty liver- check levels  Hyperlipidemia-s table o crestor  Headaches- try fioricet          Follow up in about 6 weeks (around 8/23/2023).

## 2023-07-17 ENCOUNTER — TELEPHONE (OUTPATIENT)
Dept: FAMILY MEDICINE | Facility: CLINIC | Age: 58
End: 2023-07-17
Payer: COMMERCIAL

## 2023-07-17 NOTE — TELEPHONE ENCOUNTER
Fluoxitine is not on the currently medication list and I cannot find it on recent clinic notes? But is asking for refill?  Lexapro is active.

## 2023-07-17 NOTE — TELEPHONE ENCOUNTER
No care due was identified.  [facility] Embedded Care Due Messages. Reference number: [id number].   7/17/2023 3:40:25 PM CDT

## 2023-07-17 NOTE — TELEPHONE ENCOUNTER
----- Message from Lizeht Poon sent at 7/17/2023  3:30 PM CDT -----  Regarding: RX Refill Request  Contact: Kamille Priti (sister) for patient [phone]  Type:  RX Refill Request    Who Called:  Kamille Marcos (sister) for patient at [phone]    Refill or New Rx:  refill  RX Name and Strength:  FLUoxetine 20 MG capsule 30 capsule 11 7/13/2023 7/12/2024   Sig - Route: Take 1 capsule (20 mg total) by mouth once daily. - Oral   Sent to pharmacy as: FLUoxetine 20 MG capsule   E-Prescribing Status: Receipt confirmed by pharmacy (7/13/2023  8:21 AM CDT)     Preferred Pharmacy with phone number:    [facility] #[id number] - [place] [address] [address] [place] [id number]  Phone: [phone] Fax: [phone]    Additional Information:  patient is requesting a refill of this medication. Please call and advise. Thank you

## 2023-07-18 RX ORDER — FLUOXETINE HYDROCHLORIDE 40 MG/1
40 CAPSULE ORAL DAILY
Qty: 30 CAPSULE | Refills: 0 | Status: SHIPPED | OUTPATIENT
Start: 2023-07-18 | End: 2023-07-23

## 2023-07-21 NOTE — TELEPHONE ENCOUNTER
No care due was identified.  [facility] Embedded Care Due Messages. Reference number: [id number].   7/21/2023 8:09:20 AM CDT

## 2023-07-21 NOTE — TELEPHONE ENCOUNTER
Refill Routing Note   Medication(s) are not appropriate for processing by [facility] for the following reason(s):      New or recently adjusted medication    [facility] action(s):  Defer Care Due:  None identified            Appointments  past 12m or future 3m with PCP    Date Provider   Last Visit   7/12/2023 Zakia Banerjee MD   Next Visit   8/23/2023 Zakia Banerjee MD   ED visits in past 90 days: 0        Note composed:10:31 AM 07/21/2023

## 2023-07-23 RX ORDER — FLUOXETINE HYDROCHLORIDE 40 MG/1
CAPSULE ORAL
Qty: 90 CAPSULE | Refills: 0 | Status: SHIPPED | OUTPATIENT
Start: 2023-07-23 | End: 2023-08-23 | Stop reason: SDUPTHER

## 2023-08-23 ENCOUNTER — OFFICE VISIT (OUTPATIENT)
Dept: FAMILY MEDICINE | Facility: CLINIC | Age: 58
End: 2023-08-23
Payer: MEDICARE

## 2023-08-23 VITALS
WEIGHT: 188.94 LBS | BODY MASS INDEX: 33.48 KG/M2 | DIASTOLIC BLOOD PRESSURE: 78 MMHG | SYSTOLIC BLOOD PRESSURE: 132 MMHG | OXYGEN SATURATION: 97 % | HEART RATE: 85 BPM | HEIGHT: 63 IN

## 2023-08-23 DIAGNOSIS — Z12.31 SCREENING MAMMOGRAM FOR BREAST CANCER: ICD-10-CM

## 2023-08-23 DIAGNOSIS — F32.A DEPRESSION, UNSPECIFIED DEPRESSION TYPE: ICD-10-CM

## 2023-08-23 DIAGNOSIS — H66.90 OTITIS MEDIA, UNSPECIFIED LATERALITY, UNSPECIFIED OTITIS MEDIA TYPE: Primary | ICD-10-CM

## 2023-08-23 PROCEDURE — 99214 OFFICE O/P EST MOD 30 MIN: CPT | Mod: S$PBB,,, | Performed by: INTERNAL MEDICINE

## 2023-08-23 PROCEDURE — 99214 PR OFFICE/OUTPT VISIT, EST, LEVL IV, 30-39 MIN: ICD-10-PCS | Mod: S$PBB,,, | Performed by: INTERNAL MEDICINE

## 2023-08-23 PROCEDURE — 99999 PR PBB SHADOW E&M-EST. PATIENT-LVL IV: CPT | Mod: PBBFAC,,, | Performed by: INTERNAL MEDICINE

## 2023-08-23 PROCEDURE — 99999 PR PBB SHADOW E&M-EST. PATIENT-LVL IV: ICD-10-PCS | Mod: PBBFAC,,, | Performed by: INTERNAL MEDICINE

## 2023-08-23 PROCEDURE — 99214 OFFICE O/P EST MOD 30 MIN: CPT | Mod: PBBFAC,PO | Performed by: INTERNAL MEDICINE

## 2023-08-23 RX ORDER — AMOXICILLIN AND CLAVULANATE POTASSIUM 875; 125 MG/1; MG/1
1 TABLET, FILM COATED ORAL EVERY 12 HOURS
Qty: 14 TABLET | Refills: 0 | Status: SHIPPED | OUTPATIENT
Start: 2023-08-23 | End: 2023-12-14 | Stop reason: ALTCHOICE

## 2023-08-23 RX ORDER — FLUOXETINE HYDROCHLORIDE 40 MG/1
40 CAPSULE ORAL DAILY
Qty: 90 CAPSULE | Refills: 3 | Status: SHIPPED | OUTPATIENT
Start: 2023-08-23 | End: 2023-11-16 | Stop reason: SDUPTHER

## 2023-08-23 NOTE — PROGRESS NOTES
Subjective     Patient ID: Rebeca Martinez is a 58 y.o. female.    Chief Complaint: Follow-up (6 week f/u )    Pt here for check up    Depression- improved on prozac 40. Doing better  Left ear pain-  for about a week. No fever      Review of Systems   Constitutional:  Negative for fever.   HENT:  Positive for ear pain.    Respiratory:  Negative for shortness of breath.    Cardiovascular:  Negative for chest pain.   Neurological:  Negative for headaches.          Objective     Physical Exam  Constitutional:       Appearance: Normal appearance.   HENT:      Head: Normocephalic.      Ears:      Comments: Left tm effusino    Cardiovascular:      Rate and Rhythm: Normal rate and regular rhythm.   Pulmonary:      Effort: Pulmonary effort is normal.      Breath sounds: Normal breath sounds.   Musculoskeletal:         General: Normal range of motion.      Cervical back: Normal range of motion.   Neurological:      General: No focal deficit present.      Mental Status: She is alert.   Psychiatric:         Mood and Affect: Mood normal.         Behavior: Behavior normal.            Assessment and Plan     1. Otitis media, unspecified laterality, unspecified otitis media type    2. Screening mammogram for breast cancer  -     Mammo Digital Screening Bilat w/ Td; Future; Expected date: 08/23/2023    Other orders  -     amoxicillin-clavulanate 875-125mg (AUGMENTIN) 875-125 mg per tablet; Take 1 tablet by mouth every 12 (twelve) hours.  Dispense: 14 tablet; Refill: 0  -     FLUoxetine 40 MG capsule; Take 1 capsule (40 mg total) by mouth once daily.  Dispense: 90 capsule; Refill: 3        Depression- improved. Cont rolled continue fluoxetine  Ear infection- augmentin sent in. Has appt with ent in 3 weeks         Follow up in about 6 months (around 2/23/2024).

## 2023-09-12 RX ORDER — ALPRAZOLAM 0.5 MG/1
0.5 TABLET ORAL 2 TIMES DAILY
Qty: 60 TABLET | Refills: 1 | Status: SHIPPED | OUTPATIENT
Start: 2023-09-12 | End: 2023-10-18 | Stop reason: SDUPTHER

## 2023-09-12 RX ORDER — BUSPIRONE HYDROCHLORIDE 15 MG/1
15 TABLET ORAL 3 TIMES DAILY
Qty: 270 TABLET | Refills: 1 | Status: SHIPPED | OUTPATIENT
Start: 2023-09-12 | End: 2023-12-16

## 2023-09-12 NOTE — TELEPHONE ENCOUNTER
No care due was identified.  [facility] Embedded Care Due Messages. Reference number: [id number].   9/12/2023 9:01:14 AM CDT

## 2023-09-21 ENCOUNTER — TELEPHONE (OUTPATIENT)
Dept: OTOLARYNGOLOGY | Facility: CLINIC | Age: 58
End: 2023-09-21
Payer: MEDICARE

## 2023-09-21 NOTE — TELEPHONE ENCOUNTER
----- Message from Gabriel Clarke sent at 9/21/2023 10:23 AM CDT -----  Contact: pt  Type: Needs Medical Advice  Who Called:  pt  Best Call Back Number: [phone]    Additional Information: Pt is calling the office needs a sooner appt for ear ache.Please call and advise.

## 2023-09-25 ENCOUNTER — OFFICE VISIT (OUTPATIENT)
Dept: OTOLARYNGOLOGY | Facility: CLINIC | Age: 58
End: 2023-09-25
Payer: MEDICARE

## 2023-09-25 VITALS — BODY MASS INDEX: 33.4 KG/M2 | WEIGHT: 188.5 LBS | HEIGHT: 63 IN

## 2023-09-25 DIAGNOSIS — H61.23 BILATERAL IMPACTED CERUMEN: Primary | ICD-10-CM

## 2023-09-25 DIAGNOSIS — H92.01 OTALGIA OF RIGHT EAR: ICD-10-CM

## 2023-09-25 PROCEDURE — 99999 PR PBB SHADOW E&M-EST. PATIENT-LVL III: CPT | Mod: PBBFAC,,, | Performed by: NURSE PRACTITIONER

## 2023-09-25 PROCEDURE — 69210 REMOVE IMPACTED EAR WAX UNI: CPT | Mod: PBBFAC,PO | Performed by: NURSE PRACTITIONER

## 2023-09-25 PROCEDURE — 99999 PR PBB SHADOW E&M-EST. PATIENT-LVL III: ICD-10-PCS | Mod: PBBFAC,,, | Performed by: NURSE PRACTITIONER

## 2023-09-25 PROCEDURE — 99213 PR OFFICE/OUTPT VISIT, EST, LEVL III, 20-29 MIN: ICD-10-PCS | Mod: 25,S$PBB,, | Performed by: NURSE PRACTITIONER

## 2023-09-25 PROCEDURE — 69210 PR REMOVAL IMPACTED CERUMEN REQUIRING INSTRUMENTATION, UNILATERAL: ICD-10-PCS | Mod: S$PBB,,, | Performed by: NURSE PRACTITIONER

## 2023-09-25 PROCEDURE — 69210 REMOVE IMPACTED EAR WAX UNI: CPT | Mod: S$PBB,,, | Performed by: NURSE PRACTITIONER

## 2023-09-25 PROCEDURE — 99213 OFFICE O/P EST LOW 20 MIN: CPT | Mod: 25,S$PBB,, | Performed by: NURSE PRACTITIONER

## 2023-09-25 PROCEDURE — 99213 OFFICE O/P EST LOW 20 MIN: CPT | Mod: PBBFAC,PO | Performed by: NURSE PRACTITIONER

## 2023-09-25 NOTE — PROGRESS NOTES
Subjective     Patient ID: Rebeca Martinez is a 58 y.o. female.    Chief Complaint: No chief complaint on file.    HPI  Patient was seen 3.5 months ago by Dr. Price for cerumen impaction removal; given at-home measures. She returns today for same, aural congestion and c/o pimple in right ear causing some pain.     Review of Systems   Constitutional: Negative.    HENT: Negative.     Eyes: Negative.    Respiratory: Negative.     Cardiovascular: Negative.    Gastrointestinal: Negative.    Musculoskeletal: Negative.    Integumentary:  Negative.   Neurological: Negative.    Hematological: Negative.    Psychiatric/Behavioral: Negative.            Objective     Physical Exam  Vitals and nursing note reviewed.   Constitutional:       General: She is not in acute distress.     Appearance: She is well-developed. She is not ill-appearing.   HENT:      Head: Normocephalic and atraumatic.      Right Ear: Hearing, tympanic membrane, ear canal and external ear normal. No middle ear effusion. Tympanic membrane is not erythematous.      Left Ear: Hearing, tympanic membrane, ear canal and external ear normal.  No middle ear effusion. Tympanic membrane is not erythematous.      Nose: Nose normal.   Eyes:      General: Lids are normal. No scleral icterus.        Right eye: No discharge.         Left eye: No discharge.   Neck:      Trachea: Trachea normal. No tracheal deviation.   Cardiovascular:      Rate and Rhythm: Normal rate.   Pulmonary:      Effort: Pulmonary effort is normal. No respiratory distress.      Breath sounds: No stridor. No wheezing.   Musculoskeletal:         General: Normal range of motion.      Cervical back: Normal range of motion and neck supple.   Skin:     General: Skin is warm and dry.   Neurological:      Mental Status: She is alert and oriented to person, place, and time.      Coordination: Coordination normal.      Gait: Gait normal.   Psychiatric:         Attention and Perception: Attention normal.          Mood and Affect: Mood normal.         Speech: Speech normal.         Behavior: Behavior normal. Behavior is cooperative.     SEPARATE PROCEDURE IN OFFICE:   Procedure: Removal of impacted cerumen, bilateral   Pre Procedure Diagnosis: Cerumen Impaction   Post Procedure Diagnosis: Cerumen Impaction   Verbal informed consent in regards to risk of trauma to ear canal, ear drum or hearing, discomfort during procedure and/or inability to remove cerumen impaction in one session or unforeseen events or complications.   No anesthesia.     Procedure in detail:   Ear canal visualized bilateral with appropriate size ear speculum utilizing Operating Head Binocular Otomicroscope   Utilizing the following:  Suction cannula was used. The impacted cerumen of the ear canals was removed atraumatically. The TM and EAC were then inspected and found to be clear of wax. See description of TMs/EACs in PE above.   Complications: No   Condition: Improved/Good       Assessment and Plan     1. Bilateral impacted cerumen    2. Otalgia of right ear      Continue same at-home measures and return as needed for ear cleanings. Patient encouraged to return to clinic if symptoms worsen/persist and as needed for further ENT symptoms or concerns.          No follow-ups on file.

## 2023-09-26 ENCOUNTER — TELEPHONE (OUTPATIENT)
Dept: FAMILY MEDICINE | Facility: CLINIC | Age: 58
End: 2023-09-26
Payer: MEDICARE

## 2023-09-26 RX ORDER — ARIPIPRAZOLE 2 MG/1
2 TABLET ORAL DAILY
Qty: 30 TABLET | Refills: 11 | Status: SHIPPED | OUTPATIENT
Start: 2023-09-26 | End: 2023-10-18 | Stop reason: SDUPTHER

## 2023-09-26 NOTE — TELEPHONE ENCOUNTER
Pt was left a voicemail advising that an appt was scheduled for her with Dr. Banerjee on 10/3 at 1:40pm

## 2023-09-26 NOTE — TELEPHONE ENCOUNTER
----- Message from Annabella Patel sent at 9/25/2023  9:49 AM CDT -----  Regarding: Patient Advice  Needs Medical Advice      Who Called: Pt        Would the patient rather a call back or a response via [facility]? Call back    Best Call Back Number: [phone]      Additional Information: Sts Patient was put on Prozac for moods, but says pt is still briscoe. Wanting to be seen and see if medication can be increased.   Please Advise - Thank you

## 2023-10-18 ENCOUNTER — OFFICE VISIT (OUTPATIENT)
Dept: FAMILY MEDICINE | Facility: CLINIC | Age: 58
End: 2023-10-18
Payer: MEDICARE

## 2023-10-18 ENCOUNTER — TELEPHONE (OUTPATIENT)
Dept: FAMILY MEDICINE | Facility: CLINIC | Age: 58
End: 2023-10-18
Payer: MEDICARE

## 2023-10-18 VITALS
WEIGHT: 193.31 LBS | HEIGHT: 62 IN | BODY MASS INDEX: 35.57 KG/M2 | DIASTOLIC BLOOD PRESSURE: 80 MMHG | OXYGEN SATURATION: 97 % | HEART RATE: 76 BPM | SYSTOLIC BLOOD PRESSURE: 118 MMHG

## 2023-10-18 DIAGNOSIS — G43.809 OTHER MIGRAINE WITHOUT STATUS MIGRAINOSUS, NOT INTRACTABLE: Primary | ICD-10-CM

## 2023-10-18 PROCEDURE — 99213 PR OFFICE/OUTPT VISIT, EST, LEVL III, 20-29 MIN: ICD-10-PCS | Mod: S$PBB,,, | Performed by: INTERNAL MEDICINE

## 2023-10-18 PROCEDURE — 99213 OFFICE O/P EST LOW 20 MIN: CPT | Mod: PBBFAC,PO | Performed by: INTERNAL MEDICINE

## 2023-10-18 PROCEDURE — 99999 PR PBB SHADOW E&M-EST. PATIENT-LVL III: ICD-10-PCS | Mod: PBBFAC,,, | Performed by: INTERNAL MEDICINE

## 2023-10-18 PROCEDURE — 99213 OFFICE O/P EST LOW 20 MIN: CPT | Mod: S$PBB,,, | Performed by: INTERNAL MEDICINE

## 2023-10-18 PROCEDURE — 99999 PR PBB SHADOW E&M-EST. PATIENT-LVL III: CPT | Mod: PBBFAC,,, | Performed by: INTERNAL MEDICINE

## 2023-10-18 RX ORDER — ARIPIPRAZOLE 2 MG/1
2 TABLET ORAL DAILY
Qty: 30 TABLET | Refills: 11 | Status: SHIPPED | OUTPATIENT
Start: 2023-10-18 | End: 2023-11-16

## 2023-10-18 RX ORDER — BUTALBITAL, ACETAMINOPHEN AND CAFFEINE 50; 325; 40 MG/1; MG/1; MG/1
1 TABLET ORAL EVERY 6 HOURS PRN
Qty: 30 TABLET | Refills: 1 | Status: SHIPPED | OUTPATIENT
Start: 2023-10-18 | End: 2023-11-17

## 2023-10-18 RX ORDER — ALPRAZOLAM 0.5 MG/1
0.5 TABLET ORAL 2 TIMES DAILY
Qty: 60 TABLET | Refills: 1 | Status: SHIPPED | OUTPATIENT
Start: 2023-10-18

## 2023-10-18 NOTE — PROGRESS NOTES
Subjective     Patient ID: Rebeca Martinez is a 58 y.o. female.    Chief Complaint: Follow-up, Abdominal Pain, and Headache (Every morning wakes up with one)    Pt here for er follow up. Went with migraine- given iv cocktaila nd resolved. Has been having a few headaches since then that resolve with fioricet.  Ct head negative    Follow-up  Associated symptoms include abdominal pain and headaches.   Abdominal Pain  Associated symptoms include headaches.   Headache   Associated symptoms include abdominal pain.     Review of Systems   Gastrointestinal:  Positive for abdominal pain.   Neurological:  Positive for headaches.          Objective     Physical Exam  Constitutional:       Appearance: Normal appearance.   HENT:      Head: Normocephalic.   Cardiovascular:      Rate and Rhythm: Normal rate and regular rhythm.   Pulmonary:      Effort: Pulmonary effort is normal.      Breath sounds: Normal breath sounds.   Musculoskeletal:         General: Normal range of motion.      Cervical back: Normal range of motion.   Neurological:      General: No focal deficit present.      Mental Status: She is alert.   Psychiatric:         Mood and Affect: Mood normal.         Behavior: Behavior normal.            Assessment and Plan     1. Other migraine without status migrainosus, not intractable    Other orders  -     butalbital-acetaminophen-caffeine -40 mg (FIORICET, ESGIC) -40 mg per tablet; Take 1 tablet by mouth every 6 (six) hours as needed for Pain.  Dispense: 30 tablet; Refill: 1  -     ALPRAZolam (XANAX) 0.5 MG tablet; Take 1 tablet (0.5 mg total) by mouth 2 (two) times daily.  Dispense: 60 tablet; Refill: 1  -     ARIPiprazole (ABILIFY) 2 MG Tab; Take 1 tablet (2 mg total) by mouth once daily.  Dispense: 30 tablet; Refill: 11                 No follow-ups on file.

## 2023-10-18 NOTE — TELEPHONE ENCOUNTER
----- Message from Boaz Bautista sent at 10/18/2023  8:02 AM CDT -----  Type: Needs Medical Advice  Who Called:  Sister/ Kamille  Symptoms (please be specific):  Headaches, vomiting, mood swings-- ER visit  How long has patient had these symptoms:  A few days  Pharmacy name and phone #:    [facility] #[id number] - [place] [address] [address] [facility] [address] [place] [address] [address] [place] [id number]  Phone: [phone] Fax: [phone]      Best Call Back Number: [phone]  Additional Information: Caller states that she would like a callback regarding the possible side effects of the patient's medication:    ARIPiprazole (ABILIFY) 2 MG Tab

## 2023-10-18 NOTE — PROGRESS NOTES
Subjective     Patient ID: Rebeca Martinez is a 58 y.o. female.    Chief Complaint: Follow-up    Follow-up      Review of Systems       Objective     Physical Exam       Assessment and Plan     {There are no diagnoses linked to this encounter. (Refresh or delete this SmartLink)}    ***         No follow-ups on file.

## 2023-11-08 ENCOUNTER — TELEPHONE (OUTPATIENT)
Dept: FAMILY MEDICINE | Facility: CLINIC | Age: 58
End: 2023-11-08
Payer: MEDICARE

## 2023-11-08 NOTE — TELEPHONE ENCOUNTER
----- Message from Nancy Cassidy sent at 11/8/2023  8:37 AM CST -----  Contact: pt sister jose ramon  Type: Needs Medical Advice  Who Called:  pt sister jose ramon  Best Call Back Number: [phone]  Additional Information: pt is bad mood swings.pt has run away several times.caregivers and family members are having a hard time with her.jose ramon would like to speak with the office regarding this  please call

## 2023-11-13 ENCOUNTER — TELEPHONE (OUTPATIENT)
Dept: FAMILY MEDICINE | Facility: CLINIC | Age: 58
End: 2023-11-13
Payer: MEDICARE

## 2023-11-13 NOTE — TELEPHONE ENCOUNTER
----- Message from Chantelle Fonseca sent at 11/13/2023  9:02 AM CST -----  Contact: patient sister  Type:  Needs Medical Advice    Who Called: patient's sisterKamille     Would the patient rather a call back or a response via [facility]? Call     Best Call Back Number:  [phone]    Additional Information: Patient's sisterKamille would like to speak with the nurse in regards to seeing if her sister-in-law can bring her to her appointment on Thursday.     Please call to advise

## 2023-11-13 NOTE — TELEPHONE ENCOUNTER
Kamille stated she will not be able to bring Mrs. Jose to her appointment.  Her sister in law will bring patient.  Due to Kamille is quarantine

## 2023-11-16 ENCOUNTER — OFFICE VISIT (OUTPATIENT)
Dept: FAMILY MEDICINE | Facility: CLINIC | Age: 58
End: 2023-11-16
Payer: MEDICARE

## 2023-11-16 VITALS
SYSTOLIC BLOOD PRESSURE: 116 MMHG | DIASTOLIC BLOOD PRESSURE: 84 MMHG | HEART RATE: 80 BPM | WEIGHT: 193.31 LBS | BODY MASS INDEX: 35.57 KG/M2 | HEIGHT: 62 IN | OXYGEN SATURATION: 97 %

## 2023-11-16 DIAGNOSIS — F41.9 ANXIETY: Primary | ICD-10-CM

## 2023-11-16 PROCEDURE — 99214 OFFICE O/P EST MOD 30 MIN: CPT | Mod: PBBFAC,PO | Performed by: INTERNAL MEDICINE

## 2023-11-16 PROCEDURE — 99999 PR PBB SHADOW E&M-EST. PATIENT-LVL IV: ICD-10-PCS | Mod: PBBFAC,,, | Performed by: INTERNAL MEDICINE

## 2023-11-16 PROCEDURE — 99999 PR PBB SHADOW E&M-EST. PATIENT-LVL IV: CPT | Mod: PBBFAC,,, | Performed by: INTERNAL MEDICINE

## 2023-11-16 PROCEDURE — 99214 OFFICE O/P EST MOD 30 MIN: CPT | Mod: S$PBB,,, | Performed by: INTERNAL MEDICINE

## 2023-11-16 PROCEDURE — 99214 PR OFFICE/OUTPT VISIT, EST, LEVL IV, 30-39 MIN: ICD-10-PCS | Mod: S$PBB,,, | Performed by: INTERNAL MEDICINE

## 2023-11-16 RX ORDER — FLUOXETINE HYDROCHLORIDE 40 MG/1
40 CAPSULE ORAL 2 TIMES DAILY
Qty: 180 CAPSULE | Refills: 3 | Status: SHIPPED | OUTPATIENT
Start: 2023-11-16 | End: 2024-01-22

## 2023-11-16 RX ORDER — NEOMYCIN SULFATE, POLYMYXIN B SULFATE AND HYDROCORTISONE 10; 3.5; 1 MG/ML; MG/ML; [USP'U]/ML
3 SUSPENSION/ DROPS AURICULAR (OTIC) 3 TIMES DAILY
Qty: 10 ML | Refills: 1 | Status: SHIPPED | OUTPATIENT
Start: 2023-11-16 | End: 2023-12-14 | Stop reason: ALTCHOICE

## 2023-11-16 NOTE — PROGRESS NOTES
Subjective     Patient ID: Rebeca Martinez is a 58 y.o. female.    Chief Complaint: Anxiety, Depression, and Otalgia (Left side)    Pt her efor a few reasons    1. Mood worsening since starting abilify, anger, yelling at family, irritable.  On fluoxetine 40  2. Ear pain- has appt with ent but not until next week    Anxiety        Depression  Otalgia       Review of Systems   HENT:  Positive for ear pain.    Psychiatric/Behavioral:  Positive for depression.           Objective     Physical Exam  Constitutional:       Appearance: Normal appearance.   HENT:      Head: Normocephalic.   Cardiovascular:      Rate and Rhythm: Normal rate and regular rhythm.   Pulmonary:      Effort: Pulmonary effort is normal.      Breath sounds: Normal breath sounds.   Musculoskeletal:         General: Normal range of motion.      Cervical back: Normal range of motion.   Neurological:      General: No focal deficit present.      Mental Status: She is alert.   Psychiatric:         Mood and Affect: Mood normal.         Behavior: Behavior normal.            Assessment and Plan     1. Anxiety  -     Ambulatory referral/consult to Psychology; Future; Expected date: 11/23/2023    Other orders  -     FLUoxetine 40 MG capsule; Take 1 capsule (40 mg total) by mouth 2 (two) times daily.  Dispense: 180 capsule; Refill: 3  -     neomycin-polymyxin-hydrocortisone (CORTISPORIN) 3.5-10,000-1 mg/mL-unit/mL-% otic suspension; Place 3 drops into both ears 3 (three) times daily.  Dispense: 10 mL; Refill: 1        Anxiety/depression- increae fluoxetine to 80 mg. Referred to psychology. Stop abilify  Otitis- refilled cortisporin. Has a lot of wax as well. Keep appt with ent         No follow-ups on file.

## 2023-11-20 ENCOUNTER — PATIENT MESSAGE (OUTPATIENT)
Dept: PSYCHIATRY | Facility: CLINIC | Age: 58
End: 2023-11-20
Payer: MEDICARE

## 2023-12-14 ENCOUNTER — OFFICE VISIT (OUTPATIENT)
Dept: OTOLARYNGOLOGY | Facility: CLINIC | Age: 58
End: 2023-12-14
Payer: MEDICARE

## 2023-12-14 VITALS — BODY MASS INDEX: 35.57 KG/M2 | HEIGHT: 62 IN | WEIGHT: 193.31 LBS

## 2023-12-14 DIAGNOSIS — H61.23 BILATERAL IMPACTED CERUMEN: ICD-10-CM

## 2023-12-14 DIAGNOSIS — H60.313 ACUTE DIFFUSE OTITIS EXTERNA OF BOTH EARS: Primary | ICD-10-CM

## 2023-12-14 PROCEDURE — 69210 PR REMOVAL IMPACTED CERUMEN REQUIRING INSTRUMENTATION, UNILATERAL: ICD-10-PCS | Mod: S$PBB,,, | Performed by: NURSE PRACTITIONER

## 2023-12-14 PROCEDURE — 69210 REMOVE IMPACTED EAR WAX UNI: CPT | Mod: S$PBB,,, | Performed by: NURSE PRACTITIONER

## 2023-12-14 PROCEDURE — 99999 PR PBB SHADOW E&M-EST. PATIENT-LVL II: ICD-10-PCS | Mod: PBBFAC,,, | Performed by: NURSE PRACTITIONER

## 2023-12-14 PROCEDURE — 99213 PR OFFICE/OUTPT VISIT, EST, LEVL III, 20-29 MIN: ICD-10-PCS | Mod: 25,S$PBB,, | Performed by: NURSE PRACTITIONER

## 2023-12-14 PROCEDURE — 99213 OFFICE O/P EST LOW 20 MIN: CPT | Mod: 25,S$PBB,, | Performed by: NURSE PRACTITIONER

## 2023-12-14 PROCEDURE — 69210 REMOVE IMPACTED EAR WAX UNI: CPT | Mod: 50,PBBFAC,PO | Performed by: NURSE PRACTITIONER

## 2023-12-14 PROCEDURE — 99999 PR PBB SHADOW E&M-EST. PATIENT-LVL II: CPT | Mod: PBBFAC,,, | Performed by: NURSE PRACTITIONER

## 2023-12-14 PROCEDURE — 99212 OFFICE O/P EST SF 10 MIN: CPT | Mod: 25,PBBFAC,PO | Performed by: NURSE PRACTITIONER

## 2023-12-14 RX ORDER — CIPROFLOXACIN AND DEXAMETHASONE 3; 1 MG/ML; MG/ML
4 SUSPENSION/ DROPS AURICULAR (OTIC) 2 TIMES DAILY
Qty: 7.5 ML | Refills: 0 | Status: SHIPPED | OUTPATIENT
Start: 2023-12-14 | End: 2023-12-19

## 2023-12-14 NOTE — PROGRESS NOTES
Subjective     Patient ID: Rebeca Martinez is a 58 y.o. female.    Chief Complaint: Cerumen Impaction    HPI  Patient was seen 2.5 months ago for cerumen impaction removal. She returns today for c/o otalgia AU, AD>AS, X 1-1.5 weeks with aural congestion.     Review of Systems   Constitutional: Negative.    HENT:  Positive for ear pain.    Eyes: Negative.    Respiratory: Negative.     Cardiovascular: Negative.    Gastrointestinal: Negative.    Musculoskeletal: Negative.    Integumentary:  Negative.   Neurological: Negative.    Hematological: Negative.    Psychiatric/Behavioral: Negative.            Objective     Physical Exam  Vitals and nursing note reviewed.   Constitutional:       General: She is not in acute distress.     Appearance: She is well-developed. She is not ill-appearing.   HENT:      Head: Normocephalic and atraumatic.      Right Ear: Hearing, tympanic membrane, ear canal and external ear normal. Drainage and tenderness present. No middle ear effusion. Tympanic membrane is not erythematous.      Left Ear: Hearing, tympanic membrane, ear canal and external ear normal. Drainage and tenderness present.  No middle ear effusion. Tympanic membrane is not erythematous.      Nose: Nose normal.   Eyes:      General: Lids are normal. No scleral icterus.        Right eye: No discharge.         Left eye: No discharge.   Neck:      Trachea: Trachea normal. No tracheal deviation.   Cardiovascular:      Rate and Rhythm: Normal rate.   Pulmonary:      Effort: Pulmonary effort is normal. No respiratory distress.      Breath sounds: No stridor. No wheezing.   Musculoskeletal:         General: Normal range of motion.      Cervical back: Normal range of motion and neck supple.   Skin:     General: Skin is warm and dry.   Neurological:      Mental Status: She is alert.      Coordination: Coordination normal.      Gait: Gait normal.   Psychiatric:         Attention and Perception: Attention normal.         Mood and  Affect: Mood normal.         Speech: Speech normal.         Behavior: Behavior normal. Behavior is cooperative.     SEPARATE PROCEDURE IN OFFICE:   Procedure: Removal of impacted cerumen, bilateral   Pre Procedure Diagnosis: Cerumen Impaction   Post Procedure Diagnosis: Cerumen Impaction   Verbal informed consent in regards to risk of trauma to ear canal, ear drum or hearing, discomfort during procedure and/or inability to remove cerumen impaction in one session or unforeseen events or complications.   No anesthesia.     Procedure in detail:   Ear canal visualized bilateral with appropriate size ear speculum utilizing Operating Head Binocular Otomicroscope   Utilizing the following:  Kejsqms-yegzhz-wppldwu was used AU. The impacted cerumen of the ear canals was removed atraumatically. The TM and EAC were then inspected and found to be clear of wax. See description of TMs/EACs in PE above.   Complications: No   Condition: Improved/Good       Assessment and Plan     1. Acute diffuse otitis externa of both ears  -     ciprofloxacin-dexAMETHasone 0.3-0.1% (CIPRODEX) 0.3-0.1 % DrpS; Place 4 drops into both ears 2 (two) times daily. for 5 days  Dispense: 7.5 mL; Refill: 0    2. Bilateral impacted cerumen        Ciprodex gtts AU BID X 5 days. Continue same at-home measures prior to returning to ENT as needed for ear cleanings. Patient encouraged to return to clinic if symptoms worsen/persist and as needed for further ENT symptoms or concerns.          No follow-ups on file.

## 2023-12-15 DIAGNOSIS — F41.9 ANXIETY: Primary | ICD-10-CM

## 2023-12-15 NOTE — TELEPHONE ENCOUNTER
No care due was identified.  [facility] Embedded Care Due Messages. Reference number: [id number].   12/15/2023 8:09:29 AM CST

## 2023-12-16 RX ORDER — BUSPIRONE HYDROCHLORIDE 15 MG/1
15 TABLET ORAL 3 TIMES DAILY
Qty: 270 TABLET | Refills: 1 | Status: SHIPPED | OUTPATIENT
Start: 2023-12-16 | End: 2024-01-22

## 2023-12-26 PROBLEM — E66.811 CLASS 1 OBESITY DUE TO EXCESS CALORIES WITHOUT SERIOUS COMORBIDITY WITH BODY MASS INDEX (BMI) OF 34.0 TO 34.9 IN ADULT: Status: ACTIVE | Noted: 2023-12-26

## 2023-12-26 PROBLEM — K62.5 RECTAL BLEEDING: Status: ACTIVE | Noted: 2023-12-26

## 2023-12-26 PROBLEM — E87.20 ACIDOSIS: Status: ACTIVE | Noted: 2023-12-26

## 2023-12-26 PROBLEM — E66.09 CLASS 1 OBESITY DUE TO EXCESS CALORIES WITHOUT SERIOUS COMORBIDITY WITH BODY MASS INDEX (BMI) OF 34.0 TO 34.9 IN ADULT: Status: ACTIVE | Noted: 2023-12-26

## 2023-12-28 ENCOUNTER — TELEPHONE (OUTPATIENT)
Dept: GASTROENTEROLOGY | Facility: CLINIC | Age: 58
End: 2023-12-28
Payer: MEDICARE

## 2023-12-28 ENCOUNTER — TELEPHONE (OUTPATIENT)
Dept: FAMILY MEDICINE | Facility: CLINIC | Age: 58
End: 2023-12-28
Payer: MEDICARE

## 2023-12-28 NOTE — TELEPHONE ENCOUNTER
----- Message from Lavern Stanford sent at 12/28/2023  3:53 PM CST -----  Regarding: results  Contact: sister Jamila albright  Type: Needs Medical Advice  Who Called:  sister Jamila albright   Symptoms (please be specific):    How long has patient had these symptoms:    Pharmacy name and phone #:    Best Call Back Number: [phone]  Additional Information: needs ultrasound results thanks!

## 2023-12-28 NOTE — TELEPHONE ENCOUNTER
Spoke with pt's sister. Kamille. Informed Dr. Huynh is out of the office until next week. Message will be sent to him anyways for results. Chris verbalized understanding

## 2023-12-28 NOTE — TELEPHONE ENCOUNTER
----- Message from Lcneo Nguyen sent at 12/28/2023 10:19 AM CST -----  Regarding: sooner appt  Type:  Sooner Appointment Request    Caller is requesting a sooner appointment.  Caller declined first available appointment listed below.  Caller will not accept being placed on the waitlist and is requesting a message be sent to doctor.    Name of Caller:  Japonica From [facility]  When is the first available appointment?  01/17  Symptoms:    Would the patient rather a call back or a response via [facility]? Call back   Best Call Back Number:  [phone]  Additional Information:  Pt needs a hospital f/u with in 1 week. Thanks

## 2024-01-16 ENCOUNTER — TELEPHONE (OUTPATIENT)
Dept: PSYCHIATRY | Facility: CLINIC | Age: 59
End: 2024-01-16
Payer: MEDICARE

## 2024-01-16 NOTE — TELEPHONE ENCOUNTER
----- Message from Judit Farooq MA sent at 1/16/2024  2:25 PM CST -----    ----- Message -----  From: Mayra Mejia, PhD  Sent: 1/16/2024   8:34 AM CST  To: Judy Mcdaniels    Can you please call this patient and get her scheduled for [facility]? I can see her next Monday or Tuesday. She canceled two appointments in December.     ----- Message -----  From: Constance Allen  Sent: 1/16/2024   8:15 AM CST  To: Jackie Nunez Staff    Type:  Sooner Apoointment Request    Caller is requesting a sooner appointment.  Caller declined first available appointment listed below.  Caller will not accept being placed on the waitlist and is requesting a message be sent to doctor.  Name of Caller:JOSHUACAREY AYAZ [[id number]]    When is the first available appointment?    Symptoms :NP    Would the patient rather a call back or a response via [facility]? Call back     Best Call Back Number: [phone] (mobile)  Kamille  Pt Sister     Additional Information:called to reschedule appointment that was a no show on 12/26/23

## 2024-01-16 NOTE — TELEPHONE ENCOUNTER
Called the patients sister back.     Patients sister states that they are looking for of medication management.   I got patient scheduled in [place] for 1/22/24 with jean sanchez.     I also got patient scheduled with Dr. Mejia at the end of the month for therapy

## 2024-01-18 ENCOUNTER — TELEPHONE (OUTPATIENT)
Dept: OBSTETRICS AND GYNECOLOGY | Facility: CLINIC | Age: 59
End: 2024-01-18
Payer: MEDICARE

## 2024-01-18 NOTE — TELEPHONE ENCOUNTER
----- Message from Lizeth Poon sent at 1/18/2024  9:57 AM CST -----  Regarding: Sooner Appointment Request  Contact: patient's sister, jose ramon, at [phone]  Type:  Sooner Appointment Request    Name of Caller:  patient's sisterjose ramon, at [phone]    When is the first available appointment?  03/12  Symptoms:  abnormal bleeding with clots    Additional Information:  Please call and advise. Thank you

## 2024-01-22 ENCOUNTER — OFFICE VISIT (OUTPATIENT)
Dept: PSYCHIATRY | Facility: CLINIC | Age: 59
End: 2024-01-22
Payer: MEDICARE

## 2024-01-22 ENCOUNTER — TELEPHONE (OUTPATIENT)
Dept: OBSTETRICS AND GYNECOLOGY | Facility: CLINIC | Age: 59
End: 2024-01-22
Payer: MEDICARE

## 2024-01-22 ENCOUNTER — PROCEDURE VISIT (OUTPATIENT)
Dept: OTOLARYNGOLOGY | Facility: CLINIC | Age: 59
End: 2024-01-22
Payer: MEDICARE

## 2024-01-22 VITALS
HEART RATE: 81 BPM | BODY MASS INDEX: 34.32 KG/M2 | DIASTOLIC BLOOD PRESSURE: 79 MMHG | SYSTOLIC BLOOD PRESSURE: 118 MMHG | HEIGHT: 62 IN | WEIGHT: 186.5 LBS

## 2024-01-22 DIAGNOSIS — F51.04 PSYCHOPHYSIOLOGICAL INSOMNIA: ICD-10-CM

## 2024-01-22 DIAGNOSIS — F33.0 MDD (MAJOR DEPRESSIVE DISORDER), RECURRENT EPISODE, MILD: Primary | ICD-10-CM

## 2024-01-22 DIAGNOSIS — F41.9 ANXIETY: ICD-10-CM

## 2024-01-22 DIAGNOSIS — T16.2XXA FOREIGN BODY OF LEFT EAR, INITIAL ENCOUNTER: ICD-10-CM

## 2024-01-22 DIAGNOSIS — H61.21 IMPACTED CERUMEN OF RIGHT EAR: Primary | ICD-10-CM

## 2024-01-22 DIAGNOSIS — F41.1 GAD (GENERALIZED ANXIETY DISORDER): ICD-10-CM

## 2024-01-22 PROCEDURE — 69200 CLEAR OUTER EAR CANAL: CPT | Mod: PBBFAC,PO | Performed by: NURSE PRACTITIONER

## 2024-01-22 PROCEDURE — 99999 PR PBB SHADOW E&M-EST. PATIENT-LVL IV: CPT | Mod: PBBFAC,,,

## 2024-01-22 PROCEDURE — 69200 CLEAR OUTER EAR CANAL: CPT | Mod: S$PBB,LT,, | Performed by: NURSE PRACTITIONER

## 2024-01-22 PROCEDURE — 99214 OFFICE O/P EST MOD 30 MIN: CPT | Mod: PBBFAC,PO

## 2024-01-22 PROCEDURE — 90792 PSYCH DIAG EVAL W/MED SRVCS: CPT | Mod: ,,,

## 2024-01-22 PROCEDURE — 69210 REMOVE IMPACTED EAR WAX UNI: CPT | Mod: 59,S$PBB,, | Performed by: NURSE PRACTITIONER

## 2024-01-22 RX ORDER — DOXYCYCLINE 100 MG/1
100 CAPSULE ORAL 2 TIMES DAILY
COMMUNITY
Start: 2024-01-09 | End: 2024-02-26

## 2024-01-22 RX ORDER — VENLAFAXINE HYDROCHLORIDE 37.5 MG/1
37.5 CAPSULE, EXTENDED RELEASE ORAL DAILY
Qty: 7 CAPSULE | Refills: 0 | Status: SHIPPED | OUTPATIENT
Start: 2024-01-22 | End: 2024-01-29

## 2024-01-22 RX ORDER — VENLAFAXINE HYDROCHLORIDE 75 MG/1
75 CAPSULE, EXTENDED RELEASE ORAL DAILY
Qty: 30 CAPSULE | Refills: 1 | Status: SHIPPED | OUTPATIENT
Start: 2024-01-22 | End: 2024-02-26 | Stop reason: DRUGHIGH

## 2024-01-22 RX ORDER — MUPIROCIN 20 MG/G
OINTMENT TOPICAL 3 TIMES DAILY
COMMUNITY
Start: 2024-01-09

## 2024-01-22 RX ORDER — TRAZODONE HYDROCHLORIDE 100 MG/1
TABLET ORAL
Qty: 30 TABLET | Refills: 0 | Status: SHIPPED | OUTPATIENT
Start: 2024-01-22 | End: 2024-02-26

## 2024-01-22 RX ORDER — BUTALBITAL, ACETAMINOPHEN AND CAFFEINE 50; 325; 40 MG/1; MG/1; MG/1
1 TABLET ORAL EVERY 6 HOURS PRN
COMMUNITY

## 2024-01-22 NOTE — PROGRESS NOTES
"Outpatient Psychiatry Initial Visit  01/22/2024    ID:  60 yo F presenting for an initial evaluation. Met with patient.    Reason for encounter: Referral from Dr. Banerjee. Patient complains of anxiety.    History of Present Illness:  Pt. is a 60 yo F with a past psychiatric hx of Anxiety presenting to the clinic for an initial evaluation and treatment. Past medical history outlined below. She is currently taking busPIRone (BUSPAR), FLUoxetine, ALPRAZolam (XANAX), prescribed and managed by Dr. Banerjee. She reports that these medications have not addressed her anxiety.       Pt currently endorses or denies the following symptoms:  Psych ROS  Depression: mild  Anxiety: no panic attacks, no agoraphobia, no social anxiety  PTSD: no flashbacks, nightmares, or avoidance of stimuli  Jazmine/Psychosis: No manic episodes, no A/V hallucinations  SI - No SI - access to guns:  denies    Past Psychiatric History:  Past Psych Hx:     anxiety, depression          First psych contact:  in her 40's  Prior hospitalizations:    denies  Prior suicide attempts or self harm:  denies  Prior diagnosis:  anxiety, depression  Prior meds:  Topamax (migraine), Abilify, Lexapro  Current meds:  Buspar, Prozac, Xanax  Prior psychotherapy:  denies      Past Medical Hx: Hx of TBI:   denies         Hx of seizures:  denies  No past medical history on file.          Past Surgical Hx:  Past Surgical History:   Procedure Laterality Date    APPENDECTOMY      COLONOSCOPY N/A 12/27/2023    Procedure: COLONOSCOPY;  Surgeon: Nile Huynh Jr., MD;  Location: [facility];  Service: Endoscopy;  Laterality: N/A;           Family Hx:   Paternal:  denies  Maternal:  depression, anxiety, dementia            Social Hx:   Childhood:  "don't remember much of it"  Marital Status:  single  Children:  denies  Resides:  Mom   Occupation:  disabled  Hobbies:  puzzles, bowling  Spiritism:  Yazidi  Education level:  [employer], [employer]  :   denies  Legal: "  denies    Substance Hx:  Tobacco:  denies  Alcohol:  denies  Drug use:  denies  Caffeine:  2 daily  Rehab:  denies  Prior/current AA:  denies    Review of Symptoms  GENERAL: no weight gain/loss  SKIN: no rashes or lacerations  HEAD: no headaches  EYES: no jaundice, blindness. No exophthalmos  EARS: no dizziness, tinnitus, or hearing loss  NOSE: no changes in smell  Mouth/throat: no dyskinetic movements or obvious goiter  CHEST: no SOB, hyperventilation or cough  CARDIO: no tachycardia, bradycardia, or chest pain  ABDOMEN: no nausea, vomiting, pain, constipation, or diarrhea  URINARY:  no frequency, dysuria, or sexual dysfunction  ENDOCRINE: No polydipsia, polyuria, no cold/hot intolerance  MUSCULOSKELETAL: no joint pain/stiffness  NEUROLOGIC: no weakness or sensory changes, no seizures, no confusion, memory loss, or forgetfulness, no tremor or abnormal movements    Current Evaluation:  Nutritional Screening:  Considering the patient's height and weight, medications, medical history and preferences, should a referral be made to the dietitian? No  Vitals: most recent vitals signs, dated greater than 90 days prior to this appointment, were reviewed  General: age appropriate, well nourished, casually dressed, neatly groomed  MSK: muscle strength/tone : no tremor or abnormal movements. Gait/Station: no ataxic, steady    Suicide Risk Assessment:  Protective factors: age, gender, no prior attempts, no prior hospitalizations, no ongoing substance abuse, no psychosis, single, no children, denies SI/intent/plan, seeking treatment, access to treatment, future oriented, good primary support, no access to firearms    Risks:   Patient is a low immediate and long-term risk considering risk factors    Psychiatric:  Speech: Normal rate, rhythm, volume. No latency, no pressured speech  Mood/Affect: euthymic, congruent and appropriate   Thought Process: organized, logical, linear  Thought Content: no suicidal or homicidal ideation,  "no A/V hallucinations, delusions or paranoia  Insight: Intact; aware of illness  Judgement: behavior is adequate to circumstances  Orientation: A&O x 4  Memory: Intact for content of interview, 3/3 immediate, 3/3 after 3 mins. Able to recall recent and remote events.  Language: Grossly intact, no aphasias   Concentration: Spells "world" unable forward or backwards  Knowledge/Intelligence: mentally delayed   Spouse/Partner: Supportive family    ASSESSMENT - DIAGNOSIS - GOALS:  Impression:          Pt presents to OP Psychiatry for initial evaluation and medication management of MDD, MARLEN, Insomnia. Pleasant and engaging, patient is mentally delayed and presents with sister who is one of her caregivers. She goes to [facility] once weekly on Fridays and has a caregiver twice weekly come to the house. Pt lives with mother, and receives help from multiple family members in the area. She reports mild but manageable depression on medication, unresolved anxiety, and difficulty with sleep. Sister states that her medication doesn't seem to be as effective as it used to be and she is on the maximum dose of Prozac. Discussed trying a different anti-depressant, such as Paxil or Effexor and sister states that her and her mother take Effexor and it has been effective for them. Would prefer to try Effexor. Gave taper instructions for Prozac. Pt states she has been irritable with increased HA and would like to dc Buspar. Discussed monitoring pt for the next month or two and if anxious symptoms return, it may be advised to start Buspar again. Pt and sister in agreement. Discussed decreasing Xanax dose to once daily and starting Trazodone in the evening to help with sleep. Pt and sister in agreement. Pt denies SI/HI/AVH, self-harm, plan, or intent. Pt verbalizes understanding of medication instructions through teach back, will reassess symptoms in 30 days or PRN if symptoms worsen.        Safe for outpatient tx and no acute safety " concerns.  Diagnosis/Diagnoses:  - MDD (major depressive disorder), recurrent episode, mild  - MARLEN (generalized anxiety disorder)  - Psychophysiological insomnia    Strengths/Liabilities: Patient accepts feedback & guidance. Patient is motivated for change.     Treatment Goals: Specify outcomes written in observable, behavioral terms  Anxiety: acquire relapse prevention skills, reduce physical symptoms of anxiety, reduce time spent worrying (>30 minutes/day)  Depression: Acquire relapse prevention skills, increasing energy, increasing interest in usual activities, increasing motivation, reducing excessive guilt and reducing fatigue.    Treatment Plan/Recommendations:   Medication Management: The risks and benefits of medication were discussed with the patient.   Meds:    Discontinue busPIRone (BUSPAR) 15 MG tablet.    Taper off FLUoxetine 40 MG capsule to once daily - Take 1 capsules (40 mg total) by mouth once daily for 7 days, THEN Stop Prozac.    Decrease ALPRAZolam (XANAX) 0.5 MG tablet - Take 1 tablet (0.5 mg total) by mouth once daily as needed for anxiety. Discussed risk of decreased RT, sedation, addictive potential, and not to mix with alcohol.     Start traZODone (DESYREL) 100 MG tablet - Take 0.5 tablets (50 mg total) by mouth every evening for 6 days, THEN 1 tablet (100 mg total) every evening for insomnia.    Start venlafaxine (EFFEXOR-XR) 37.5 MG 24 hr capsule - Take 1 capsule (37.5 mg total) by mouth once daily. for 7 days, THEN  Start venlafaxine (EFFEXOR-XR) 75 MG 24 hr capsule - Take 1 capsule (75 mg total) by mouth once daily for anxiety and depression.  Discussed potential for GI side effects, sexual dysfunction, mood destabilization, headaches.    Labs:  none   Return to Clinic:  30 days or PRN if symptoms worsen  Counseling time:  35 mins  Total time:  60 mins    - Patient given contact # for psychotherapists at [facility] and also instructed they may check with insurance for a list of  providers.   - Call to report any worsening of symptoms or problems associated with medication  - Pt instructed to go to ER if thoughts of harming self or others arise     - Spent 60min face to face with the pt; >50% time spent in counseling   - Supportive therapy and psycho education provided  - R/B/SE's of medications discussed with the pt who expresses understanding and chooses to take medications as prescribed.   - Pt instructed to call clinic, 911 or go to nearest emergency room if symptoms worsen or pt is in crisis. The pt expresses understanding.      Debbi Nunez NP  Psychiatric-Mental Health Nurse Practitioner  Department of Psychiatry    [facility] [place] [address] [place] [address]  Phone:  [phone]  Fax: [phone]

## 2024-01-22 NOTE — TELEPHONE ENCOUNTER
----- Message from Nancy Cassidy sent at 1/22/2024  3:24 PM CST -----  Contact: pt sister deborah  Type: Needs Medical Advice  Who Called:  pt sister deborah in law  Best Call Back Number: [phone]  Additional Information: would like to discuss pt's appt tomorrow. pt is special needs.please call

## 2024-01-22 NOTE — TELEPHONE ENCOUNTER
Spoke with, Mounika, patient sister in law/caregiver, who states that patient has to have an exam while sedated as she is special needs. Explained that I would give that information to Dr Fonseca.

## 2024-01-22 NOTE — PATIENT INSTRUCTIONS
Discontinue busPIRone (BUSPAR) 15 MG tablet.    Taper off FLUoxetine 40 MG capsule to once daily - Take 1 capsules (40 mg total) by mouth once daily for 7 days, THEN Stop Prozac.    Decrease ALPRAZolam (XANAX) 0.5 MG tablet - Take 1 tablet (0.5 mg total) by mouth once daily as needed for anxiety. Discussed risk of decreased RT, sedation, addictive potential, and not to mix with alcohol.     Start traZODone (DESYREL) 100 MG tablet - Take 0.5 tablets (50 mg total) by mouth every evening for 6 days, THEN 1 tablet (100 mg total) every evening for insomnia.    Start venlafaxine (EFFEXOR-XR) 37.5 MG 24 hr capsule - Take 1 capsule (37.5 mg total) by mouth once daily. for 7 days, THEN        Start venlafaxine (EFFEXOR-XR) 75 MG 24 hr capsule - Take 1 capsule          (75 mg total) by mouth once daily for anxiety and depression.

## 2024-01-22 NOTE — TELEPHONE ENCOUNTER
This patient is scheduled tomorrow for EMB. Sister in Law called to let us know that she is special needs. Exams have to be done with sedation. She's had and EMB done in the OR for PMB. Would like to discuss a hysterectomy.

## 2024-01-22 NOTE — PROCEDURES
Ear Cerumen Removal    Date/Time: 1/22/2024 9:00 AM    Performed by: Sadie Fuchs NP  Authorized by: Sadie Fuchs NP    Consent Done?:  Not Needed    Local anesthetic:  None  Location details:  Right ear  Procedure type comment:  Suction cannula  Cerumen  Removal Results:  Cerumen completely removed  Foreign Body Removal    Date/Time: 1/22/2024 9:00 AM    Performed by: Sadie Fuchs NP  Authorized by: Sadie Fuchs NP  Consent Done: Not Needed  Body area: ear  Location details: left ear    Patient sedated: no  Patient restrained: no  Patient cooperative: yes  Localization method: ENT speculum  Removal mechanism: alligator forceps  Complexity: simple  Post-procedure assessment: foreign body removed  Patient tolerance: Patient tolerated the procedure well with no immediate complications  Comments: Cotton ball

## 2024-01-23 ENCOUNTER — OFFICE VISIT (OUTPATIENT)
Dept: OBSTETRICS AND GYNECOLOGY | Facility: CLINIC | Age: 59
End: 2024-01-23
Payer: MEDICARE

## 2024-01-23 VITALS
SYSTOLIC BLOOD PRESSURE: 124 MMHG | BODY MASS INDEX: 34.03 KG/M2 | DIASTOLIC BLOOD PRESSURE: 68 MMHG | WEIGHT: 186.06 LBS

## 2024-01-23 DIAGNOSIS — N95.0 POSTMENOPAUSAL BLEEDING: Primary | ICD-10-CM

## 2024-01-23 DIAGNOSIS — D21.9 FIBROID: ICD-10-CM

## 2024-01-23 DIAGNOSIS — Z12.4 ENCOUNTER FOR PAPANICOLAOU SMEAR FOR CERVICAL CANCER SCREENING: ICD-10-CM

## 2024-01-23 PROCEDURE — 99999 PR PBB SHADOW E&M-EST. PATIENT-LVL III: CPT | Mod: PBBFAC,,, | Performed by: OBSTETRICS & GYNECOLOGY

## 2024-01-23 PROCEDURE — 88175 CYTOPATH C/V AUTO FLUID REDO: CPT | Performed by: OBSTETRICS & GYNECOLOGY

## 2024-01-23 PROCEDURE — 99203 OFFICE O/P NEW LOW 30 MIN: CPT | Mod: S$PBB,,, | Performed by: OBSTETRICS & GYNECOLOGY

## 2024-01-23 PROCEDURE — 99213 OFFICE O/P EST LOW 20 MIN: CPT | Mod: PBBFAC,PN | Performed by: OBSTETRICS & GYNECOLOGY

## 2024-01-23 NOTE — PROGRESS NOTES
Chief Complaint   Patient presents with    Establish Care    Postmenopausal Bleeding     New Patient. Patient's caregiver reports patient postmenopausal bleeding; reports about a month ago started bleeding heavily with clots; admitted into [facility] and was advised patient had fibroid tumors       History of Present Illness: Rebeca Martinez is a 59 y.o. female that presents today 1/23/2024 with LMP No LMP recorded. Patient is postmenopausal. for   Chief Complaint   Patient presents with    Establish Care    Postmenopausal Bleeding     New Patient. Patient's caregiver reports patient postmenopausal bleeding; reports about a month ago started bleeding heavily with clots; admitted into [facility] and was advised patient had fibroid tumors     She is with her sister in law who gives history that says she had one episode of postmenopausal bleeding 9 months ago.  She underwent a D&C 9 months ago which they were told everything was normal.  She was told she had a fibroid on an ultrasound this [place] 12/2023.      At [place] they say her pass  large clots again.  They went to ER were admitted and had a colonoscopy.  Internal hemorrhoids were found.  Her sister in law denies any bleeding since then.      Her POA is her brother (her GUME's )    History reviewed. No pertinent past medical history.    Past Surgical History:   Procedure Laterality Date    APPENDECTOMY      CHOLECYSTECTOMY      COLONOSCOPY N/A 12/27/2023    Procedure: COLONOSCOPY;  Surgeon: Nile Huynh Jr., MD;  Location: [facility];  Service: Endoscopy;  Laterality: N/A;       Outpatient Medications Prior to Visit   Medication Sig Dispense Refill    ALPRAZolam (XANAX) 0.5 MG tablet Take 1 tablet (0.5 mg total) by mouth 2 (two) times daily. 60 tablet 1    ascorbic acid, vitamin C, (VITAMIN C) 250 MG tablet Take 250 mg by mouth 2 (two) times daily.      butalbital-acetaminophen-caffeine -40 mg (FIORICET, ESGIC) -40 mg per tablet Take 1 tablet  by mouth every 6 (six) hours as needed for Pain.      diphenhydramine HCl (BENADRYL ORAL) Take 1 tablet by mouth daily as needed (allergies).      doxycycline (VIBRAMYCIN) 100 MG Cap Take 100 mg by mouth 2 (two) times daily.      ibuprofen (ADVIL,MOTRIN) 200 MG tablet Take 200 mg by mouth daily as needed for Pain.      MULTIVITAMIN ORAL Take 1 tablet by mouth 2 (two) times a day.      mupirocin (BACTROBAN) 2 % ointment Apply topically 3 (three) times daily.      rizatriptan (MAXALT) 10 MG tablet Take 10 mg by mouth daily as needed for Migraine.      rosuvastatin (CRESTOR) 5 MG tablet Take 5 mg by mouth every Mon, Wed, Fri.      topiramate (TOPAMAX) 50 MG tablet Take 50 mg by mouth 2 (two) times daily.      traZODone (DESYREL) 100 MG tablet Take 0.5 tablets (50 mg total) by mouth every evening for 6 days, THEN 1 tablet (100 mg total) every evening for 27 days. 30 tablet 0    venlafaxine (EFFEXOR-XR) 37.5 MG 24 hr capsule Take 1 capsule (37.5 mg total) by mouth once daily. for 7 days 7 capsule 0    venlafaxine (EFFEXOR-XR) 75 MG 24 hr capsule Take 1 capsule (75 mg total) by mouth once daily. 30 capsule 1     No facility-administered medications prior to visit.       Review of patient's allergies indicates:  No Known Allergies    History reviewed. No pertinent family history.    Social History     Tobacco Use    Smoking status: Never    Smokeless tobacco: Never   Substance Use Topics    Alcohol use: Never    Drug use: Never       OB History   No obstetric history on file.         /68   Wt 84.4 kg (186 lb 1.1 oz)   Physical Exam:  APPEARANCE: Well nourished, well developed, in no acute distress.  SKIN: Normal skin turgor, no lesions.  NECK: Neck symmetric without masses   RESPIRATORY: Normal respiratory effort with no retractions or use of accessory muscles  CARDIOVASCULAR: Peripheral vascular system with no swelling no varicosities and palpation of pulses normal  LYMPHATIC: No enlargements of the lymph nodes  noted in the neck, axillae, or groin  ABDOMEN: Soft. No tenderness or masses. No hepatosplenomegaly. No hernias.  PELVIC: Normal external female genitalia without lesions. Normal hair distribution. Adequate perineal body, normal urethral meatus. Urethra with no masses.  Bladder nontender. Vagina moist and well rugated without lesions or discharge. Cervix pink and without lesions. PAP done No significant cystocele or rectocele. Bimanual exam showed uterus normal size, shape, position, mobile and nontender. Adnexa without masses or tenderness. Urethra and bladder normal.  VERY LARGE external hemorrhoids seen    EXTREMITIES: No clubbing cyanosis or edema.    ASSESSMENT/PLAN:  Postmenopausal bleeding  Comments:  unsure if this was hemorrhoid or vaginal  D&C 3/23 benign    Fibroid  Comments:  noted on ultrasound 12/23    Encounter for Papanicolaou smear for cervical cancer screening  -     Liquid-Based Pap Smear, Screening      30 minutes spent today spent preparing reviewing previous external notes, reviewing previous results, performing medical examination, ordering tests or medications, counseling and documenting.

## 2024-01-26 ENCOUNTER — PATIENT MESSAGE (OUTPATIENT)
Dept: PSYCHIATRY | Facility: CLINIC | Age: 59
End: 2024-01-26
Payer: MEDICARE

## 2024-01-26 LAB
FINAL PATHOLOGIC DIAGNOSIS: NORMAL
Lab: NORMAL

## 2024-02-26 ENCOUNTER — OFFICE VISIT (OUTPATIENT)
Dept: PSYCHIATRY | Facility: CLINIC | Age: 59
End: 2024-02-26
Payer: MEDICARE

## 2024-02-26 VITALS
BODY MASS INDEX: 33.94 KG/M2 | HEART RATE: 88 BPM | SYSTOLIC BLOOD PRESSURE: 116 MMHG | WEIGHT: 184.44 LBS | DIASTOLIC BLOOD PRESSURE: 79 MMHG | HEIGHT: 62 IN

## 2024-02-26 DIAGNOSIS — F33.0 MDD (MAJOR DEPRESSIVE DISORDER), RECURRENT EPISODE, MILD: Primary | ICD-10-CM

## 2024-02-26 DIAGNOSIS — F41.1 GAD (GENERALIZED ANXIETY DISORDER): ICD-10-CM

## 2024-02-26 DIAGNOSIS — F51.04 PSYCHOPHYSIOLOGICAL INSOMNIA: ICD-10-CM

## 2024-02-26 PROCEDURE — 99999 PR PBB SHADOW E&M-EST. PATIENT-LVL III: CPT | Mod: PBBFAC,,,

## 2024-02-26 PROCEDURE — 99213 OFFICE O/P EST LOW 20 MIN: CPT | Mod: PBBFAC,PO

## 2024-02-26 PROCEDURE — 99214 OFFICE O/P EST MOD 30 MIN: CPT | Mod: S$PBB,,,

## 2024-02-26 RX ORDER — VENLAFAXINE HYDROCHLORIDE 150 MG/1
150 CAPSULE, EXTENDED RELEASE ORAL DAILY
Qty: 30 CAPSULE | Refills: 1 | Status: SHIPPED | OUTPATIENT
Start: 2024-02-26 | End: 2024-05-06 | Stop reason: SDUPTHER

## 2024-02-26 NOTE — PROGRESS NOTES
Outpatient Psychiatry Follow-Up Visit    Clinical Status of Patient: Outpatient (Ambulatory)  02/26/2024     Chief Complaint: Pt is a 60 yo F who presents today for a follow-up. Met with patient.       Interval History and Content of Current Session:  Interim Events/Subjective Report/Content of Current Session:  follow up appointment.    Pt is a 60 yo F with past psychiatric hx of MDD (major depressive disorder), recurrent episode, mild, MARLEN (generalized anxiety disorder), Psychophysiological insomnia who presents for follow up treatment.     Past Psychiatric hx:   Pt. is a 60 yo F with a past psychiatric hx of Anxiety presenting to the clinic for an initial evaluation and treatment. Past medical history outlined below. She is currently taking busPIRone (BUSPAR), FLUoxetine, ALPRAZolam (XANAX), prescribed and managed by Dr. Banerjee. She reports that these medications have not addressed her anxiety.     Pt presents to OP Psychiatry for initial evaluation and medication management of MDD, MARLEN, Insomnia. Pleasant and engaging, patient is mentally delayed and presents with sister who is one of her caregivers. She goes to [facility] once weekly on Fridays and has a caregiver twice weekly come to the house. Pt lives with mother, and receives help from multiple family members in the area. She reports mild but manageable depression on medication, unresolved anxiety, and difficulty with sleep. Sister states that her medication doesn't seem to be as effective as it used to be and she is on the maximum dose of Prozac. Discussed trying a different anti-depressant, such as Paxil or Effexor and sister states that her and her mother take Effexor and it has been effective for them. Would prefer to try Effexor. Gave taper instructions for Prozac. Pt states she has been irritable with increased HA and would like to dc Buspar. Discussed monitoring pt for the next month or two and if anxious symptoms return, it may be advised to start Buspar  again. Pt and sister in agreement. Discussed decreasing Xanax dose to once daily and starting Trazodone in the evening to help with sleep. Pt and sister in agreement. Pt denies SI/HI/AVH, self-harm, plan, or intent. Pt verbalizes understanding of medication instructions through teach back, will reassess symptoms in 30 days or PRN if symptoms worsen.     Past Medical hx: History reviewed. No pertinent past medical history.     Interim hx:  Medication changes last visit:     1.   Discontinue busPIRone (BUSPAR) 15 MG tablet.  2.   Taper off FLUoxetine 40 MG capsule to once daily - Take 1 capsules (40 mg total) by mouth once daily for 7 days, THEN Stop Prozac.  Decrease ALPRAZolam (XANAX) 0.5 MG tablet - Take 1 tablet (0.5 mg total) by mouth once daily as needed for anxiety. Discussed risk of decreased RT, sedation, addictive potential, and not to mix with alcohol.   Start traZODone (DESYREL) 100 MG tablet - Take 0.5 tablets (50 mg total) by mouth every evening for 6 days, THEN 1 tablet (100 mg total) every evening for insomnia.  Start venlafaxine (EFFEXOR-XR) 37.5 MG 24 hr capsule - Take 1 capsule (37.5 mg total) by mouth once daily. for 7 days, THEN  Start venlafaxine (EFFEXOR-XR) 75 MG 24 hr capsule - Take 1 capsule (75 mg total) by mouth once daily for anxiety and depression.  Discussed potential for GI side effects, sexual dysfunction, mood destabilization, headaches.    Anxiety: Same, no improvement  Depression: Same, no improvement  Sleep: Same, no improvement  Appetite: Good, no issues     Denies suicidal/homicidal ideations.  Denies hopelessness/worthlessness.    Denies auditory/visual hallucinations      Tobacco:  denies  Alcohol:  denies  Drug use:  denies  Caffeine:  2 daily      Review of Systems   PSYCHIATRIC: Pertinent items are noted in the narrative.        CONSTITUTIONAL: weight stable        M/S: no pain today         ENT: no allergies noted today        ABD: no n/v/d     Past Medical, Family and  Social History: The patient's past medical, family and social history have been reviewed and updated as appropriate within the electronic medical record. See encounter notes.     Medication Compliance: yes      Side effects: tolerates     Risk Parameters:  Patient reports no suicidal ideation  Patient reports no homicidal ideation  Patient reports no self-injurious behavior  Patient reports no violent behavior     Exam (detailed: at least 9 elements; comprehensive: all 15 elements)   Constitutional  Vitals:  Most recent vital signs, dated less than 90 days prior to this appointment, were reviewed. Pulse:  [88]   BP: (116)/(79)     General:  unremarkable, age appropriate, casual attire, good eye contact, good rapport       Musculoskeletal  Muscle Strength/Tone:  no flaccidity, no tremor    Gait & Station:  normal      Psychiatric                       Speech:  normal tone, normal rate, rhythm, and volume   Mood & Affect:   Euthymic, congruent, appropriate         Thought Process:   Goal directed; Linear    Associations:   intact   Thought Content:   No SI/HI, delusions, or paranoia, no AV/VH   Insight & Judgement:   Good, adequate to circumstances   Orientation:   grossly intact; alert and oriented x 4    Memory:  intact for content of interview    Language:  grossly intact, can repeat    Attention Span  : Grossly intact for content of interview   Fund of Knowledge:   intact and appropriate to age and level of education        Assessment and Diagnosis   Status/Progress: Based on the examination today, the patient's problem(s) is/are under fair control.  New problems have not been presented today. Comorbidities are not currently complicating management of the primary condition.      Impression:  Pt presents to OP Psychiatry for routine follow-up for treatment/medication management of MDD, MARLEN, Insomnia. Pt did not experience any benefit to Trazodone at night, pt was initially up at night and napping during the day at  home, the Trazodone did make her sleepier at night, but pt continues to nap/sleep during the day. Sister/caretaker dc Trazodone. No improvements in anxiety or depression/mood, discussed increasing Effexor to 150 mg daily, pt/sister in agreement. Pt is still only taking Xanax once daily but we discussed only taking it during the day if needed (it may be making her drowsy) but preferably at night. Pt/sister verbalizes understanding. Pt denies SI/HI/AVH, self-harm, plan, or intent. Pt verbalizes understanding of medication instructions through teach back. No other issues, concerns, or problems, will reassess symptoms and medications in 30 days or PRN if symptoms worsen.      Diagnosis:   - MDD (major depressive disorder), recurrent episode, mild  - MARLEN (generalized anxiety disorder)  - Psychophysiological insomnia      Intervention/Counseling/Treatment Plan   Medication Management:       Increase venlafaxine (EFFEXOR-XR) 150 MG 24 hr capsule - Take 1 capsule (150 mg total) by mouth once daily for anxiety and depression.  Discussed potential for GI side effects, sexual dysfunction, mood destabilization, headaches.     Discontinue traZODone (DESYREL) 100 MG tablet.     3.   Continue ALPRAZolam (XANAX) 0.5 MG tablet - Take 1 tablet (0.5 mg total) by mouth once daily as needed for anxiety. Discussed risk of decreased RT, sedation, addictive potential, and not to mix with alcohol.      4.   Call to report any worsening of symptoms or problems with the medication. Pt instructed to go to ER with thoughts of harming self, others     5.   Patient given contact # for psychotherapists at [facility] and also instructed she may check with insurance for list of providers.          Labs: none         Return to clinic:      30 days or PRN if symptoms worsen    -Spent 30min face to face with the pt; >50% time spent in counseling   -Supportive therapy and psychoeducation provided  -R/B/SE's of medications discussed with the pt  who expresses understanding and chooses to take medications as prescribed.   -Pt instructed to call clinic, 911 or go to nearest emergency room if sxs worsen or pt is in   crisis. The pt expresses understanding.      Debbi Nunez NP  Psychiatric-Mental Health Nurse Practitioner  Department of Psychiatry    [facility] [place] [address] [place] [address]  Phone:  [phone]  Fax: [phone]

## 2024-02-27 ENCOUNTER — OFFICE VISIT (OUTPATIENT)
Dept: FAMILY MEDICINE | Facility: CLINIC | Age: 59
End: 2024-02-27
Payer: MEDICARE

## 2024-02-27 VITALS
SYSTOLIC BLOOD PRESSURE: 116 MMHG | DIASTOLIC BLOOD PRESSURE: 78 MMHG | HEART RATE: 89 BPM | BODY MASS INDEX: 34.24 KG/M2 | OXYGEN SATURATION: 96 % | WEIGHT: 186.06 LBS | HEIGHT: 62 IN

## 2024-02-27 DIAGNOSIS — F41.1 GAD (GENERALIZED ANXIETY DISORDER): ICD-10-CM

## 2024-02-27 DIAGNOSIS — M48.8X6 OTHER SPECIFIED SPONDYLOPATHIES, LUMBAR REGION: Primary | ICD-10-CM

## 2024-02-27 DIAGNOSIS — Z00.00 ENCOUNTER FOR MEDICARE ANNUAL WELLNESS EXAM: ICD-10-CM

## 2024-02-27 PROCEDURE — 99213 OFFICE O/P EST LOW 20 MIN: CPT | Mod: PBBFAC,PO | Performed by: INTERNAL MEDICINE

## 2024-02-27 PROCEDURE — 99214 OFFICE O/P EST MOD 30 MIN: CPT | Mod: S$PBB,,, | Performed by: INTERNAL MEDICINE

## 2024-02-27 PROCEDURE — 99999 PR PBB SHADOW E&M-EST. PATIENT-LVL III: CPT | Mod: PBBFAC,,, | Performed by: INTERNAL MEDICINE

## 2024-02-27 NOTE — PROGRESS NOTES
Subjective     Patient ID: Rebeca Martinez is a 59 y.o. female.    Chief Complaint: Follow-up    Here for routine check up    Rectal bleeding- was admtited, likely from internal hemorrhoids, resolved  Depression-s table on effexor, following with psych      Review of Systems   Constitutional:  Negative for fever.   Respiratory:  Negative for shortness of breath.    Cardiovascular:  Negative for chest pain.   Neurological:  Negative for headaches.          Objective     Physical Exam  Constitutional:       Appearance: Normal appearance.   HENT:      Head: Normocephalic.   Cardiovascular:      Rate and Rhythm: Normal rate and regular rhythm.   Pulmonary:      Effort: Pulmonary effort is normal.      Breath sounds: Normal breath sounds.   Musculoskeletal:         General: Normal range of motion.      Cervical back: Normal range of motion.   Neurological:      General: No focal deficit present.      Mental Status: She is alert.   Psychiatric:         Mood and Affect: Mood normal.         Behavior: Behavior normal.          Assessment and Plan     1. Other specified spondylopathies, lumbar region    2. ISIDORO (generalized anxiety disorder)        Isidoro- satble  Back pain- recommended exercising         Follow up in about 1 year (around 2/27/2025).

## 2024-04-01 PROBLEM — K62.5 RECTAL BLEEDING: Status: RESOLVED | Noted: 2023-12-26 | Resolved: 2024-04-01

## 2024-04-19 ENCOUNTER — OFFICE VISIT (OUTPATIENT)
Dept: PSYCHIATRY | Facility: CLINIC | Age: 59
End: 2024-04-19
Payer: MEDICARE

## 2024-04-19 VITALS
BODY MASS INDEX: 34.2 KG/M2 | HEIGHT: 62 IN | HEART RATE: 87 BPM | DIASTOLIC BLOOD PRESSURE: 85 MMHG | SYSTOLIC BLOOD PRESSURE: 117 MMHG | WEIGHT: 185.88 LBS

## 2024-04-19 DIAGNOSIS — F33.0 MDD (MAJOR DEPRESSIVE DISORDER), RECURRENT EPISODE, MILD: Primary | ICD-10-CM

## 2024-04-19 DIAGNOSIS — F41.1 GAD (GENERALIZED ANXIETY DISORDER): ICD-10-CM

## 2024-04-19 PROBLEM — F51.04 PSYCHOPHYSIOLOGICAL INSOMNIA: Status: RESOLVED | Noted: 2024-01-22 | Resolved: 2024-04-19

## 2024-04-19 PROCEDURE — 99999 PR PBB SHADOW E&M-EST. PATIENT-LVL III: CPT | Mod: PBBFAC,,,

## 2024-04-19 PROCEDURE — 99213 OFFICE O/P EST LOW 20 MIN: CPT | Mod: PBBFAC,PO

## 2024-04-19 PROCEDURE — 99214 OFFICE O/P EST MOD 30 MIN: CPT | Mod: S$PBB,,,

## 2024-04-19 RX ORDER — ARIPIPRAZOLE 5 MG/1
5 TABLET ORAL DAILY
Qty: 30 TABLET | Refills: 1 | Status: SHIPPED | OUTPATIENT
Start: 2024-04-19 | End: 2024-06-18 | Stop reason: SDUPTHER

## 2024-04-19 NOTE — PROGRESS NOTES
Outpatient Psychiatry Follow-Up Visit    Clinical Status of Patient: Outpatient (Ambulatory)  04/19/2024     Chief Complaint: Pt is a 59 y.o. female who presents today for a follow-up. Met with patient.       Interval History and Content of Current Session:  Interim Events/Subjective Report/Content of Current Session:  follow up appointment.    Pt is a 59 y.o. female with past psychiatric hx of MDD (major depressive disorder), recurrent episode, mild, MARLEN (generalized anxiety disorder), Psychophysiological insomnia who presents for follow up treatment.     Past Psychiatric hx:   Pt. is a 58 yo F with a past psychiatric hx of Anxiety presenting to the clinic for an initial evaluation and treatment. Past medical history outlined below. She is currently taking busPIRone (BUSPAR), FLUoxetine, ALPRAZolam (XANAX), prescribed and managed by Dr. Banerjee. She reports that these medications have not addressed her anxiety.     2/26/24: (Pt was due for 30 day FU on 3/26/24) Pt presents to OP Psychiatry for routine follow-up for treatment/medication management of MDD, MARLEN, Insomnia. Pt did not experience any benefit to Trazodone at night, pt was initially up at night and napping during the day at home, the Trazodone did make her sleepier at night, but pt continues to nap/sleep during the day. Sister/caretaker dc Trazodone. No improvements in anxiety or depression/mood, discussed increasing Effexor to 150 mg daily, pt/sister in agreement. Pt is still only taking Xanax once daily but we discussed only taking it during the day if needed (it may be making her drowsy) but preferably at night. Pt/sister verbalizes understanding. Pt denies SI/HI/AVH, self-harm, plan, or intent. Pt verbalizes understanding of medication instructions through teach back. No other issues, concerns, or problems, will reassess symptoms and medications in 30 days or PRN if symptoms worsen.     Past Medical hx: No past medical history on file.     Interim  hx:  Medication changes last visit:     Increase venlafaxine (EFFEXOR-XR) 150 MG 24 hr capsule - Take 1 capsule (150 mg total) by mouth once daily for anxiety and depression.  Discussed potential for GI side effects, sexual dysfunction, mood destabilization, headaches.  Discontinue traZODone (DESYREL) 100 MG tablet.  3.   Continue ALPRAZolam (XANAX) 0.5 MG tablet - Take 1 tablet (0.5 mg total) by mouth once daily as needed for anxiety. Discussed risk of decreased RT, sedation, addictive potential, and not to mix with alcohol.     Anxiety: Increased at times  Depression:  Increased  Sleep: Good, no issues  Appetite: Good, no issues     Denies suicidal/homicidal ideations.  Denies hopelessness/worthlessness.    Denies auditory/visual hallucinations.      Tobacco:  denies  Alcohol:  denies  Drug use:  denies  Caffeine:  2 daily      Review of Systems   PSYCHIATRIC: Pertinent items are noted in the narrative.        CONSTITUTIONAL: weight stable        M/S: no pain today         ENT: no allergies noted today        ABD: no n/v/d     Past Medical, Family and Social History: The patient's past medical, family and social history have been reviewed and updated as appropriate within the electronic medical record. See encounter notes.     Medication Compliance: yes      Side effects: tolerates     Risk Parameters:  Patient reports no suicidal ideation  Patient reports no homicidal ideation  Patient reports no self-injurious behavior  Patient reports no violent behavior     Exam (detailed: at least 9 elements; comprehensive: all 15 elements)   Constitutional  Vitals:  Most recent vital signs, dated less than 90 days prior to this appointment, were reviewed. Pulse:  [87]   BP: (117)/(85)     General:  unremarkable, age appropriate, casual attire, good eye contact, good rapport       Musculoskeletal  Muscle Strength/Tone:  no flaccidity, no tremor    Gait & Station:  normal      Psychiatric                       Speech:  normal  "tone, normal rate, rhythm, and volume   Mood & Affect:   Euthymic, congruent, appropriate         Thought Process:   Goal directed; Linear    Associations:   intact   Thought Content:   No SI/HI, delusions, or paranoia, no AV/VH   Insight & Judgement:   Good, adequate to circumstances   Orientation:   grossly intact; alert and oriented x 4    Memory:  intact for content of interview    Language:  grossly intact, can repeat    Attention Span  : Grossly intact for content of interview   Fund of Knowledge:   intact and appropriate to age and level of education        Assessment and Diagnosis   Status/Progress: Based on the examination today, the patient's problem(s) is/are under fair control.  New problems have not been presented today. Comorbidities are not currently complicating management of the primary condition.      Impression:  Pt presents to OP Psychiatry for routine follow-up for treatment/medication management of MDD, MARLEN, Insomnia. Pt's sister reports that she is sleeping much better at night and has not been taking the Trazodone. Reports better night time sleep d/t dc of Xanax, "It was making her so sleepy that she would sleep during the day. Now she stays up all day and is sleeping all night". Sister reports that she has been more depressed and anxious at times, even irritable when interacting with family members. Sister states that, "She gets jealous and wants everyone to pay attention to her and sometimes when they talk to other people she gets mad". Discussed restarting Abilify as an adjunct for depression, pt sister and pt in agreement. Would like to keep Effexor at current dose. Pt denies SI/HI/AVH, self-harm, plan, or intent. Pt verbalizes understanding of medication instructions through teach back. No other issues, concerns, or problems, will reassess symptoms and medications in 14 days or PRN if symptoms worsen.      Diagnosis:   - MDD (major depressive disorder), recurrent episode, mild  - MARLEN " (generalized anxiety disorder)        Intervention/Counseling/Treatment Plan   Medication Management:      1.   Continue venlafaxine (EFFEXOR-XR) 150 MG 24 hr capsule - Take 1 capsule (150 mg total) by mouth once daily for anxiety and depression.  Discussed potential for GI side effects, sexual dysfunction, mood destabilization, headaches.     Discontinued ALPRAZolam (XANAX) 0.5 MG tablet about a month ago d/t sedation, denies any wd SE.      3.   Start ARIPiprazole (ABILIFY) 5 MG Tab - Take 1 tablet (5 mg total) by mouth once daily for depression/mood (adjunct). Typical ANAYA's reviewed including weight gain, abnormal movements, EPS, TD, metabolic side effects.      4.   Call to report any worsening of symptoms or problems with the medication. Pt instructed to go to ER with thoughts of harming self, others.     5.   Patient given contact # for psychotherapists at [facility] and also instructed she may check with insurance for list of providers.          Labs: none         Return to clinic:     14 days or PRN if symptoms worsen     -Spent 30min face to face with the pt; >50% time spent in counseling   -Supportive therapy and psychoeducation provided  -R/B/SE's of medications discussed with the pt who expresses understanding and chooses to take medications as prescribed.   -Pt instructed to call clinic, 911 or go to nearest emergency room if sxs worsen or pt is in   crisis. The pt expresses understanding.      Debbi Nunez NP  Psychiatric-Mental Health Nurse Practitioner  Department of Psychiatry    [facility] [place] [address] [place] [address]  Phone:  [phone]  Fax: [phone]

## 2024-04-29 ENCOUNTER — TELEPHONE (OUTPATIENT)
Dept: OTOLARYNGOLOGY | Facility: CLINIC | Age: 59
End: 2024-04-29
Payer: MEDICARE

## 2024-04-29 NOTE — TELEPHONE ENCOUNTER
----- Message from Chantelle Raymundo sent at 4/29/2024  2:05 PM CDT -----  Contact: patient sister  Type:  Sooner Apoointment Request    Caller is requesting a sooner appointment.  Caller declined first available appointment listed below.  Caller will not accept being placed on the waitlist and is requesting a message be sent to doctor.    Name of Caller:patient sister     When is the first available appointment?    Symptoms:ear cleaning     Would the patient rather a call back or a response via [facility]? Call     Best Call Back Number:[phone] (home)      Additional Information:

## 2024-05-06 ENCOUNTER — PROCEDURE VISIT (OUTPATIENT)
Dept: OTOLARYNGOLOGY | Facility: CLINIC | Age: 59
End: 2024-05-06
Payer: MEDICARE

## 2024-05-06 DIAGNOSIS — F33.0 MDD (MAJOR DEPRESSIVE DISORDER), RECURRENT EPISODE, MILD: ICD-10-CM

## 2024-05-06 DIAGNOSIS — F41.1 GAD (GENERALIZED ANXIETY DISORDER): ICD-10-CM

## 2024-05-06 DIAGNOSIS — H61.23 BILATERAL IMPACTED CERUMEN: Primary | ICD-10-CM

## 2024-05-06 PROCEDURE — 69209 REMOVE IMPACTED EAR WAX UNI: CPT | Mod: PBBFAC,PO | Performed by: NURSE PRACTITIONER

## 2024-05-06 PROCEDURE — 69210 REMOVE IMPACTED EAR WAX UNI: CPT | Mod: S$PBB,,, | Performed by: NURSE PRACTITIONER

## 2024-05-06 RX ORDER — VENLAFAXINE HYDROCHLORIDE 150 MG/1
150 CAPSULE, EXTENDED RELEASE ORAL DAILY
Qty: 30 CAPSULE | Refills: 1 | Status: SHIPPED | OUTPATIENT
Start: 2024-05-06 | End: 2024-07-05

## 2024-05-06 NOTE — PROCEDURES
Ear Cerumen Removal    Date/Time: 5/6/2024 9:00 AM    Performed by: Sadie Fuchs NP  Authorized by: Sadie Fuchs NP    Consent Done?:  Not Needed    Local anesthetic:  None  Location details:  Both ears  Procedure type: curette    Procedure type comment:  Lavage and suction  Cerumen  Removal Results:  Cerumen completely removed  Patient tolerance:  Patient tolerated the procedure well with no immediate complications

## 2024-05-16 ENCOUNTER — OFFICE VISIT (OUTPATIENT)
Dept: FAMILY MEDICINE | Facility: CLINIC | Age: 59
End: 2024-05-16
Payer: MEDICARE

## 2024-05-16 VITALS
HEART RATE: 92 BPM | WEIGHT: 191.56 LBS | DIASTOLIC BLOOD PRESSURE: 90 MMHG | OXYGEN SATURATION: 96 % | HEIGHT: 62 IN | SYSTOLIC BLOOD PRESSURE: 132 MMHG | BODY MASS INDEX: 35.25 KG/M2

## 2024-05-16 DIAGNOSIS — Z09 HOSPITAL DISCHARGE FOLLOW-UP: ICD-10-CM

## 2024-05-16 DIAGNOSIS — F33.0 MDD (MAJOR DEPRESSIVE DISORDER), RECURRENT EPISODE, MILD: ICD-10-CM

## 2024-05-16 DIAGNOSIS — S01.81XD LACERATION OF FOREHEAD, SUBSEQUENT ENCOUNTER: ICD-10-CM

## 2024-05-16 DIAGNOSIS — F41.1 GAD (GENERALIZED ANXIETY DISORDER): Primary | ICD-10-CM

## 2024-05-16 PROCEDURE — 99214 OFFICE O/P EST MOD 30 MIN: CPT | Mod: S$PBB,,,

## 2024-05-16 PROCEDURE — 99999 PR PBB SHADOW E&M-EST. PATIENT-LVL III: CPT | Mod: PBBFAC,,,

## 2024-05-16 PROCEDURE — 99213 OFFICE O/P EST LOW 20 MIN: CPT | Mod: PBBFAC,PO

## 2024-05-16 PROCEDURE — 99024 POSTOP FOLLOW-UP VISIT: CPT | Mod: POP,,,

## 2024-05-16 RX ORDER — NAPROXEN 500 MG/1
500 TABLET ORAL 2 TIMES DAILY
COMMUNITY
Start: 2024-05-14

## 2024-05-16 NOTE — PROGRESS NOTES
"Name: Rebeca Martinez  MRN: [id number]  : 1965  PCP: Zakia Banerjee MD    HPI    Patient follows with Dr. Banerjee, new to me. She presents for hospital follow up following a fall. She has three sutures placed for two lacerations to her forehead. She is following up with [facility] for right elbow fracture. She denies any acute complaints at this time. She presents with a family member.      ED course below:  "59-year-old female presents by EMS with a complaint of left knee pain, laceration to the forehead after trip and fall.  On arrival patient was nontoxic appearing with stable vital signs.  Regular rate and rhythm without respiratory distress.  Lungs are clear to auscultation.  Patient has tenderness and swelling to left lateral knee as well as a two minor .5 cm lacerations to mid frontal scalp. Pt has been able to ambulate without difficulty in ED.      Differential diagnosis includes but not limited to fracture, sprain, contusion, laceration, ICH.  Will evaluate with a head CT, imaging.  Will update tetanus. Lacerations repaired. Tylenol given for pain.      CT head without acute intracranial abnormality.  X-ray without acute fracture.     Based on these findings, coupled with patient's hemodynamically stable vital signs and nontoxic appearance, do not believe they are in acute danger at this time.  Advised close follow up with PCP for suture removal.  Patient given strict ED return precautions to which patient verbalized understanding.  Patient stable for discharge home.     I have discussed this case with my supervising physician who is directly involved in this patient's care and management. I have reviewed this patient's records.  They have personally and independently evaluated the patient agree with the plan of care."    Review of Systems   Respiratory:  Negative for shortness of breath.    Cardiovascular:  Negative for chest pain.   Musculoskeletal:  Positive for arthralgias.       Patient " Active Problem List   Diagnosis    Class 1 obesity due to excess calories without serious comorbidity with body mass index (BMI) of 34.0 to 34.9 in adult    Acidosis    MDD (major depressive disorder), recurrent episode, mild    MARLEN (generalized anxiety disorder)    Other specified spondylopathies, lumbar region       Vitals:    05/16/24 1042   BP: (!) 132/90   Pulse: 92       Physical Exam  Constitutional:       General: She is not in acute distress.     Appearance: She is well-developed.   HENT:      Head: Normocephalic and atraumatic.      Right Ear: External ear normal.      Left Ear: External ear normal.   Eyes:      Conjunctiva/sclera: Conjunctivae normal.      Pupils: Pupils are equal, round, and reactive to light.   Neck:      Thyroid: No thyromegaly.   Cardiovascular:      Heart sounds: S1 normal and S2 normal.   Musculoskeletal:         General: No swelling or tenderness. Normal range of motion.      Cervical back: Normal range of motion and neck supple.   Skin:     General: Skin is warm and dry.      Coloration: Skin is not jaundiced or pale.      Comments: 2 Well healed laceration and abrasion noted forehead    Neurological:      General: No focal deficit present.      Mental Status: She is alert and oriented to person, place, and time.      Cranial Nerves: No cranial nerve deficit.   Psychiatric:         Mood and Affect: Mood normal.         Behavior: Behavior normal.         1. MARLEN (generalized anxiety disorder)   Stable with current medications    2. Hospital discharge follow-up   Sutures to laceration forehead removed    3. Laceration of forehead, subsequent encounter   -suture removal    4. MDD (major depressive disorder), recurrent episode, mild   Continue with current medications. Stable        Follow up as needed      PARVEEN Sabillon  05/16/2024

## 2024-05-20 ENCOUNTER — TELEPHONE (OUTPATIENT)
Dept: FAMILY MEDICINE | Facility: CLINIC | Age: 59
End: 2024-05-20
Payer: MEDICARE

## 2024-05-20 NOTE — TELEPHONE ENCOUNTER
----- Message from Keri Henson, Patient Care Assistant sent at 5/20/2024  9:20 AM CDT -----  Regarding: advice  Contact: Kamille dominguez's sister  Type: Needs Medical Advice    Who Called:  Kamille dominguez's sister    Symptoms (please be specific):  pt fell - head bleeding, knee swollen and left foot pain     How long has patient had these symptoms:  2 weeks ago     Best Call Back Number: [phone]     Additional Information: please call Kamille dominguez's sister to advise. Thanks!

## 2024-05-20 NOTE — TELEPHONE ENCOUNTER
Kamille patient sister will take her to the Emergency Department.  Patient have new pain and swelling.

## 2024-05-29 ENCOUNTER — PATIENT MESSAGE (OUTPATIENT)
Dept: PSYCHIATRY | Facility: CLINIC | Age: 59
End: 2024-05-29
Payer: MEDICARE

## 2024-05-29 RX ORDER — TOPIRAMATE 50 MG/1
50 TABLET, FILM COATED ORAL 2 TIMES DAILY
Qty: 60 TABLET | Refills: 0 | Status: CANCELLED | OUTPATIENT
Start: 2024-05-29

## 2024-05-29 NOTE — TELEPHONE ENCOUNTER
----- Message from Zenia Serrano sent at 5/29/2024  3:36 PM CDT -----  Contact: Kamille Moore  Type:  RX Refill Request    Who Called:   Kamille Moore  Refill or New Rx:  Refill    RX Name and Strength:  topiramate (TOPAMAX) 50 MG tablet     Preferred Pharmacy with phone number:      [id number] [place] [address] & [address] [address]  Phone: [phone] Fax: [phone]    Local or Mail Order:  Local  Ordering Provider:  Dr Banerjee    Would the patient rather a call back or a response via [facility]?   Call back  Best Call Back Number:  [phone] - Kamille    Additional Information:   States she is calling in refills but is asking for it to be noted to send to the above-referenced pharmacy - please call - thank you

## 2024-05-30 RX ORDER — TOPIRAMATE 50 MG/1
50 TABLET, FILM COATED ORAL 2 TIMES DAILY
Qty: 60 TABLET | Refills: 11 | Status: SHIPPED | OUTPATIENT
Start: 2024-05-30

## 2024-06-06 ENCOUNTER — OFFICE VISIT (OUTPATIENT)
Dept: FAMILY MEDICINE | Facility: CLINIC | Age: 59
End: 2024-06-06
Payer: MEDICARE

## 2024-06-06 VITALS
DIASTOLIC BLOOD PRESSURE: 88 MMHG | HEART RATE: 88 BPM | BODY MASS INDEX: 36.17 KG/M2 | SYSTOLIC BLOOD PRESSURE: 132 MMHG | WEIGHT: 196.56 LBS | HEIGHT: 62 IN | OXYGEN SATURATION: 96 %

## 2024-06-06 DIAGNOSIS — E78.9 LIPID DISORDER: ICD-10-CM

## 2024-06-06 DIAGNOSIS — F41.1 GAD (GENERALIZED ANXIETY DISORDER): Primary | ICD-10-CM

## 2024-06-06 DIAGNOSIS — F33.0 MDD (MAJOR DEPRESSIVE DISORDER), RECURRENT EPISODE, MILD: ICD-10-CM

## 2024-06-06 PROCEDURE — 99214 OFFICE O/P EST MOD 30 MIN: CPT | Mod: S$PBB,,, | Performed by: INTERNAL MEDICINE

## 2024-06-06 PROCEDURE — 99213 OFFICE O/P EST LOW 20 MIN: CPT | Mod: PBBFAC,PO | Performed by: INTERNAL MEDICINE

## 2024-06-06 PROCEDURE — 99999 PR PBB SHADOW E&M-EST. PATIENT-LVL III: CPT | Mod: PBBFAC,,, | Performed by: INTERNAL MEDICINE

## 2024-06-06 RX ORDER — FUROSEMIDE 20 MG/1
20 TABLET ORAL DAILY PRN
Qty: 30 TABLET | Refills: 1 | Status: SHIPPED | OUTPATIENT
Start: 2024-06-06 | End: 2025-06-06

## 2024-06-06 RX ORDER — TRAZODONE HYDROCHLORIDE 50 MG/1
50 TABLET ORAL NIGHTLY
Qty: 30 TABLET | Refills: 11 | Status: SHIPPED | OUTPATIENT
Start: 2024-06-06 | End: 2025-06-06

## 2024-06-06 RX ORDER — FUROSEMIDE 20 MG/1
20 TABLET ORAL DAILY PRN
Qty: 90 TABLET | OUTPATIENT
Start: 2024-06-06

## 2024-06-06 NOTE — TELEPHONE ENCOUNTER
Refill Decision Note   Rebeca Martinez  is requesting a refill authorization.  Brief Assessment and Rationale for Refill:  Quick Discontinue     Medication Therapy Plan: Pharmacy is requesting new scripts for the following medications without required information, (sig/ frequency/qty/etc)     Medication Reconciliation Completed: No   Comments:     No Care Gaps recommended.     Note composed:12:39 PM 06/06/2024

## 2024-06-06 NOTE — TELEPHONE ENCOUNTER
Quick DC. Request already responded to by other means (e.g. phone or fax)   Refill Authorization Note   Rebeca Martinez  is requesting a refill authorization.  Brief Assessment and Rationale for Refill:  Quick Discontinue  Medication Therapy Plan:  Receipt confirmed by pharmacy (6/6/2024  9:30 AM CDT)    Medication Reconciliation Completed:  No      Comments:     Note composed:12:45 PM 06/06/2024

## 2024-06-06 NOTE — TELEPHONE ENCOUNTER
No care due was identified.  [facility] Embedded Care Due Messages. Reference number: [id number].   6/06/2024 10:37:12 AM CDT

## 2024-06-06 NOTE — PROGRESS NOTES
Subjective     Patient ID: Rebeca Martinez is a 59 y.o. female.    Chief Complaint: form (90L form)    Pt here for check up    Hyperlipidemia- on crestor  Depression- stable on medication  Insomnia- uncontrolled on melatonin      Review of Systems   Constitutional:  Negative for fever.   Respiratory:  Negative for shortness of breath.    Cardiovascular:  Negative for chest pain.   Neurological:  Negative for headaches.          Objective     Physical Exam  Constitutional:       Appearance: Normal appearance.   HENT:      Head: Normocephalic.   Cardiovascular:      Rate and Rhythm: Normal rate and regular rhythm.   Pulmonary:      Effort: Pulmonary effort is normal.      Breath sounds: Normal breath sounds.   Musculoskeletal:         General: Normal range of motion.      Cervical back: Normal range of motion.      Right lower leg: Edema present.      Left lower leg: Edema present.   Neurological:      General: No focal deficit present.      Mental Status: She is alert.   Psychiatric:         Mood and Affect: Mood normal.         Behavior: Behavior normal.            Assessment and Plan     1. ISIDORO (generalized anxiety disorder)    2. MDD (major depressive disorder), recurrent episode, mild    3. Lipid disorder    Other orders  -     traZODone (DESYREL) 50 MG tablet; Take 1 tablet (50 mg total) by mouth every evening.  Dispense: 30 tablet; Refill: 11  -     furosemide (LASIX) 20 MG tablet; Take 1 tablet (20 mg total) by mouth daily as needed (edema).  Dispense: 30 tablet; Refill: 1        Venous insufficiency- leg elevation, prn lasix  Isidoro/depression-s table  Insomani- try trazodone  Filled out 90L         Follow up in about 6 months (around 12/6/2024).

## 2024-06-18 DIAGNOSIS — F33.0 MDD (MAJOR DEPRESSIVE DISORDER), RECURRENT EPISODE, MILD: ICD-10-CM

## 2024-06-18 RX ORDER — ARIPIPRAZOLE 5 MG/1
5 TABLET ORAL
Qty: 30 TABLET | Refills: 1 | OUTPATIENT
Start: 2024-06-18

## 2024-06-18 RX ORDER — ARIPIPRAZOLE 5 MG/1
5 TABLET ORAL DAILY
Qty: 30 TABLET | Refills: 1 | Status: SHIPPED | OUTPATIENT
Start: 2024-06-18 | End: 2024-08-17

## 2024-06-20 DIAGNOSIS — F41.1 GAD (GENERALIZED ANXIETY DISORDER): ICD-10-CM

## 2024-06-20 DIAGNOSIS — F33.0 MDD (MAJOR DEPRESSIVE DISORDER), RECURRENT EPISODE, MILD: ICD-10-CM

## 2024-06-25 ENCOUNTER — TELEPHONE (OUTPATIENT)
Dept: FAMILY MEDICINE | Facility: CLINIC | Age: 59
End: 2024-06-25
Payer: MEDICARE

## 2024-06-25 NOTE — TELEPHONE ENCOUNTER
----- Message from Elizabeth Rapp sent at 6/25/2024  2:15 PM CDT -----  Type:  Needs Medical Advice    Who Called:  Pt's sister Kamille    Would the patient rather a call back or a response via [facility]?  Call back    Best Call Back Number:  [phone]    Additional Information:  List of medications faxed to Dariana at [facility], [phone], please reference Rebeca Martinez. It said was attached but was not with the 90-L form.  Please call back to advise. Thanks!

## 2024-06-25 NOTE — TELEPHONE ENCOUNTER
Spoke to pt's sister,  she stated that Dariana at [facility] needs pt's med list faxed.    I faxed med list to [phone], attn Dariana.

## 2024-06-26 ENCOUNTER — OFFICE VISIT (OUTPATIENT)
Dept: PSYCHIATRY | Facility: CLINIC | Age: 59
End: 2024-06-26
Payer: MEDICARE

## 2024-06-26 VITALS
HEIGHT: 62 IN | HEART RATE: 71 BPM | SYSTOLIC BLOOD PRESSURE: 142 MMHG | DIASTOLIC BLOOD PRESSURE: 88 MMHG | BODY MASS INDEX: 35.95 KG/M2

## 2024-06-26 DIAGNOSIS — F41.1 GAD (GENERALIZED ANXIETY DISORDER): ICD-10-CM

## 2024-06-26 DIAGNOSIS — F51.04 PSYCHOPHYSIOLOGICAL INSOMNIA: ICD-10-CM

## 2024-06-26 DIAGNOSIS — F33.0 MDD (MAJOR DEPRESSIVE DISORDER), RECURRENT EPISODE, MILD: Primary | ICD-10-CM

## 2024-06-26 PROCEDURE — 99214 OFFICE O/P EST MOD 30 MIN: CPT | Mod: S$PBB,,,

## 2024-06-26 PROCEDURE — 99999 PR PBB SHADOW E&M-EST. PATIENT-LVL III: CPT | Mod: PBBFAC,,,

## 2024-06-26 PROCEDURE — 99213 OFFICE O/P EST LOW 20 MIN: CPT | Mod: PBBFAC,PO

## 2024-06-26 RX ORDER — ALPRAZOLAM 0.25 MG/1
0.25 TABLET ORAL 2 TIMES DAILY PRN
Qty: 60 TABLET | Refills: 0 | Status: SHIPPED | OUTPATIENT
Start: 2024-06-26 | End: 2024-07-26

## 2024-06-26 RX ORDER — ARIPIPRAZOLE 2 MG/1
2 TABLET ORAL DAILY
Qty: 30 TABLET | Refills: 1 | Status: SHIPPED | OUTPATIENT
Start: 2024-06-26 | End: 2024-08-25

## 2024-06-26 RX ORDER — VENLAFAXINE HYDROCHLORIDE 150 MG/1
150 CAPSULE, EXTENDED RELEASE ORAL DAILY
Qty: 30 CAPSULE | Refills: 1 | Status: SHIPPED | OUTPATIENT
Start: 2024-06-26 | End: 2024-08-25

## 2024-06-26 NOTE — PATIENT INSTRUCTIONS
Continue venlafaxine (EFFEXOR-XR) 150 MG 24 hr capsule - Take 1 capsule (150 mg total) by mouth once daily for anxiety and depression.       2.   Decrease ARIPiprazole (ABILIFY) 2 MG Tab - Take 1 tablet (2 mg total) by mouth once daily for depression/mood (adjunct).      3.   Restart ALPRAZolam (XANAX) 0.25 MG tablet - Take 1 tablet (0.25 mg total) by mouth 2 (two) times daily as needed for Anxiety. Discussed risk of decreased RT, sedation, addictive potential, and not to mix with alcohol.     4.   Continue traZODone (DESYREL) 50 MG tablet - Take 1 tablet (50 mg total) by mouth every evening for insomnia.

## 2024-06-26 NOTE — PROGRESS NOTES
"Outpatient Psychiatry Follow-Up Visit    Clinical Status of Patient: Outpatient (Ambulatory)  06/26/2024     Chief Complaint: Pt is a 59 y.o. female who presents today for a follow-up. Met with patient.       Interval History and Content of Current Session:  Interim Events/Subjective Report/Content of Current Session:  follow up appointment.    Pt is a 59 y.o. female with past psychiatric hx of MDD (major depressive disorder), recurrent episode, mild, MARLEN (generalized anxiety disorder) who presents for follow up treatment.     Past Psychiatric hx:   Pt. is a 60 yo F with a past psychiatric hx of Anxiety presenting to the clinic for an initial evaluation and treatment. Past medical history outlined below. She is currently taking busPIRone (BUSPAR), FLUoxetine, ALPRAZolam (XANAX), prescribed and managed by Dr. Banerjee. She reports that these medications have not addressed her anxiety.     4/19/24: (Pt was due for a FU on 5/3/24, cancelled 5/10/24 appt) Pt presents to OP Psychiatry for routine follow-up for treatment/medication management of MDD, MARLEN, Insomnia. Pt's sister reports that she is sleeping much better at night and has not been taking the Trazodone. Reports better night time sleep d/t dc of Xanax, "It was making her so sleepy that she would sleep during the day. Now she stays up all day and is sleeping all night". Sister reports that she has been more depressed and anxious at times, even irritable when interacting with family members. Sister states that, "She gets jealous and wants everyone to pay attention to her and sometimes when they talk to other people she gets mad". Discussed restarting Abilify as an adjunct for depression, pt sister and pt in agreement. Would like to keep Effexor at current dose. Pt denies SI/HI/AVH, self-harm, plan, or intent. Pt verbalizes understanding of medication instructions through teach back. No other issues, concerns, or problems, will reassess symptoms and medications in 14 " days or PRN if symptoms worsen.     Past Medical hx: No past medical history on file.     Interim hx:  Medication changes last visit:     1.   Continue venlafaxine (EFFEXOR-XR) 150 MG 24 hr capsule - Take 1 capsule (150 mg total) by mouth once daily for anxiety and depression.  Discussed potential for GI side effects, sexual dysfunction, mood destabilization, headaches.  2.   Discontinued ALPRAZolam (XANAX) 0.5 MG tablet about a month ago d/t sedation, denies any wd SE.   3.   Start ARIPiprazole (ABILIFY) 5 MG Tab - Take 1 tablet (5 mg total) by mouth once daily for depression/mood (adjunct). Typical ANAYA's reviewed including weight gain, abnormal movements, EPS, TD, metabolic side effects.     Anxiety: Increased  Depression: Same, no issues  Sleep: Improved after starting Trazodone  Appetite: Increased     Denies suicidal/homicidal ideations.  Denies hopelessness/worthlessness.    Denies auditory/visual hallucinations.      Tobacco:  denies  Alcohol:  denies  Drug use:  denies  Caffeine:  2 daily      Review of Systems   PSYCHIATRIC: Pertinent items are noted in the narrative.        CONSTITUTIONAL: weight stable        M/S: no pain today         ENT: no allergies noted today        ABD: no n/v/d     Past Medical, Family and Social History: The patient's past medical, family and social history have been reviewed and updated as appropriate within the electronic medical record. See encounter notes.     Medication Compliance: yes      Side effects: tolerates     Risk Parameters:  Patient reports no suicidal ideation  Patient reports no homicidal ideation  Patient reports no self-injurious behavior  Patient reports no violent behavior     Exam (detailed: at least 9 elements; comprehensive: all 15 elements)   Constitutional  Vitals:  Most recent vital signs, dated less than 90 days prior to this appointment, were reviewed. Pulse:  [71]   BP: (142)/(88)     General:  unremarkable, age appropriate, casual attire, good eye  contact, good rapport       Musculoskeletal  Muscle Strength/Tone:  no flaccidity, no tremor    Gait & Station:  normal      Psychiatric                       Speech:  normal tone, normal rate, rhythm, and volume   Mood & Affect:   Euthymic, congruent, appropriate         Thought Process:   Goal directed; Linear    Associations:   intact   Thought Content:   No SI/HI, delusions, or paranoia, no AV/VH   Insight & Judgement:   Good, adequate to circumstances   Orientation:   grossly intact; alert and oriented x 4    Memory:  intact for content of interview    Language:  grossly intact, can repeat    Attention Span  : Grossly intact for content of interview   Fund of Knowledge:   intact and appropriate to age and level of education        Assessment and Diagnosis   Status/Progress: Based on the examination today, the patient's problem(s) is/are under fair control.  New problems have not been presented today. Comorbidities are not currently complicating management of the primary condition.      Impression:  Pt presents to OP Psychiatry for routine follow-up for treatment/medication management of MDD, MARLEN. Pt/family reports weight gain since starting Abilify, discussed decreasing dose to 2 mg, pt/family in agreement. Pt started taking Trazodone again per sister as her sleep worsened after dc of Xanax. Pt reports increased anxiety and irritability since dc of Xanax, discussed restarting at lower dose, pt/family in agreement. Pt uses eye drops and takes allergy medicine, reports watery eyes. Discussed with family if symptoms do not resolve to contact PCP. Pt/family verbalize understanding of medication changes. Pt denies SI/HI/AVH, self-harm, plan, or intent. Pt verbalizes understanding of medication instructions through teach back. No other issues, concerns, or problems, will reassess symptoms and medications in 30 days or PRN if symptoms worsen.      Diagnosis:   - MDD (major depressive disorder), recurrent episode,  mild  - MARLEN (generalized anxiety disorder)      Intervention/Counseling/Treatment Plan   Medication Management:      Continue venlafaxine (EFFEXOR-XR) 150 MG 24 hr capsule - Take 1 capsule (150 mg total) by mouth once daily for anxiety and depression.  Discussed potential for GI side effects, sexual dysfunction, mood destabilization, headaches.      2.   Decrease ARIPiprazole (ABILIFY) 2 MG Tab - Take 1 tablet (2 mg total) by mouth once daily for depression/mood (adjunct). Typical ANAYA's reviewed including weight gain, abnormal movements, EPS, TD, metabolic side effects.     3.   Restart ALPRAZolam (XANAX) 0.25 MG tablet - Take 1 tablet (0.25 mg total) by mouth 2 (two) times daily as needed for Anxiety. Discussed risk of decreased RT, sedation, addictive potential, and not to mix with alcohol.     4.   Continue traZODone (DESYREL) 50 MG tablet - Take 1 tablet (50 mg total) by mouth every evening for insomnia.     5.   Call to report any worsening of symptoms or problems with the medication. Pt instructed to go to ER with thoughts of harming self, others.     6.   Patient given contact # for psychotherapists at [facility] and also instructed she may check with insurance for list of providers.          Labs: none         Return to clinic:      30 days or PRN if symptoms worsen    -Spent 30min face to face with the pt; >50% time spent in counseling   -Supportive therapy and psychoeducation provided  -R/B/SE's of medications discussed with the pt who expresses understanding and chooses to take medications as prescribed.   -Pt instructed to call clinic, 911 or go to nearest emergency room if sxs worsen or pt is in   crisis. The pt expresses understanding.      Debbi Nunez NP  Psychiatric-Mental Health Nurse Practitioner  Department of Psychiatry    [facility] [place] [address] [place] [address]  Phone:  [phone]  Fax: [phone]

## 2024-07-02 ENCOUNTER — OFFICE VISIT (OUTPATIENT)
Dept: OTOLARYNGOLOGY | Facility: CLINIC | Age: 59
End: 2024-07-02
Payer: MEDICARE

## 2024-07-02 VITALS — BODY MASS INDEX: 36.44 KG/M2 | HEIGHT: 62 IN | WEIGHT: 198 LBS

## 2024-07-02 DIAGNOSIS — H92.03 OTALGIA, BILATERAL: ICD-10-CM

## 2024-07-02 DIAGNOSIS — H61.23 BILATERAL IMPACTED CERUMEN: ICD-10-CM

## 2024-07-02 DIAGNOSIS — H60.90 OTITIS EXTERNA, UNSPECIFIED CHRONICITY, UNSPECIFIED LATERALITY, UNSPECIFIED TYPE: Primary | ICD-10-CM

## 2024-07-02 PROCEDURE — 69210 REMOVE IMPACTED EAR WAX UNI: CPT | Mod: PBBFAC,PO | Performed by: STUDENT IN AN ORGANIZED HEALTH CARE EDUCATION/TRAINING PROGRAM

## 2024-07-02 PROCEDURE — 99212 OFFICE O/P EST SF 10 MIN: CPT | Mod: PBBFAC,PO,25 | Performed by: STUDENT IN AN ORGANIZED HEALTH CARE EDUCATION/TRAINING PROGRAM

## 2024-07-02 PROCEDURE — 99999 PR PBB SHADOW E&M-EST. PATIENT-LVL II: CPT | Mod: PBBFAC,,, | Performed by: STUDENT IN AN ORGANIZED HEALTH CARE EDUCATION/TRAINING PROGRAM

## 2024-07-02 RX ORDER — CIPROFLOXACIN AND DEXAMETHASONE 3; 1 MG/ML; MG/ML
4 SUSPENSION/ DROPS AURICULAR (OTIC) 2 TIMES DAILY
Qty: 7.5 ML | Refills: 6 | Status: SHIPPED | OUTPATIENT
Start: 2024-07-02 | End: 2024-07-12

## 2024-07-02 NOTE — PROGRESS NOTES
Otolaryngology Clinic Note    Subjective:       Patient ID: Rebeca Martinez is a 59 y.o. female.    Chief Complaint: Cerumen Impaction      History of Present Illness: Rebeca Martinez is a 59 y.o. female presenting with bilateral ear pain since Saturday. Hearing is muffled. Gets wax cleaned regularly. No drainage. No nasal issues. No sore throat.       Past Surgical History:   Procedure Laterality Date    APPENDECTOMY      CHOLECYSTECTOMY      COLONOSCOPY N/A 12/27/2023    Procedure: COLONOSCOPY;  Surgeon: Nile Huynh Jr., MD;  Location: [facility];  Service: Endoscopy;  Laterality: N/A;     No past medical history on file.  Social Determinants of Health     Tobacco Use: Low Risk  (7/2/2024)    Patient History     Smoking Tobacco Use: Never     Smokeless Tobacco Use: Never     Passive Exposure: Not on file   Alcohol Use: Not At Risk (4/18/2024)    AUDIT-C     Frequency of Alcohol Consumption: Never     Average Number of Drinks: Patient does not drink     Frequency of Binge Drinking: Never   Financial Resource Strain: Patient Declined (4/18/2024)    Overall Financial Resource Strain (CARDIA)     Difficulty of Paying Living Expenses: Patient declined   Food Insecurity: Patient Declined (4/18/2024)    Hunger Vital Sign     Worried About Running Out of Food in the Last Year: Patient declined     Ran Out of Food in the Last Year: Patient declined   Transportation Needs: No Transportation Needs (4/18/2024)    PRAPARE - Transportation     Lack of Transportation (Medical): No     Lack of Transportation (Non-Medical): No   Physical Activity: Unknown (4/18/2024)    Exercise Vital Sign     Days of Exercise per Week: 0 days     Minutes of Exercise per Session: Not on file   Stress: Stress Concern Present (4/18/2024)    [employer] - Occupational Stress Questionnaire     Feeling of Stress : To some extent   Housing Stability: Unknown (4/18/2024)    Housing Stability Vital Sign     Unable  to Pay for Housing in the Last Year: Patient declined     Homeless in the Last Year: Not on file   Depression: Low Risk  (2/27/2024)    Depression     Last PHQ-4: Flowsheet Data: 1   Utilities: Not on file   Health Literacy: Not on file   Social Isolation: Not on file     Review of patient's allergies indicates:  No Known Allergies  Current Outpatient Medications   Medication Instructions    ALPRAZolam (XANAX) 0.25 mg, Oral, 2 times daily PRN    ARIPiprazole (ABILIFY) 2 mg, Oral, Daily    ascorbic acid (vitamin C) (VITAMIN C) 250 mg, Oral, 2 times daily    butalbital-acetaminophen-caffeine -40 mg (FIORICET, ESGIC) -40 mg per tablet 1 tablet, Oral, Every 6 hours PRN    ciprofloxacin-dexAMETHasone 0.3-0.1% (CIPRODEX) 0.3-0.1 % DrpS 4 drops, Left Ear, 2 times daily    furosemide (LASIX) 20 mg, Oral, Daily PRN    ibuprofen (ADVIL,MOTRIN) 200 mg, Oral, Daily PRN    MULTIVITAMIN ORAL 1 tablet, Oral, 2 times daily    naproxen (NAPROSYN) 500 mg, Oral, 2 times daily    rosuvastatin (CRESTOR) 5 mg, Oral, Every Mon, Wed, Fri    topiramate (TOPAMAX) 50 mg, Oral, 2 times daily    traZODone (DESYREL) 50 mg, Oral, Nightly    venlafaxine (EFFEXOR-XR) 150 mg, Oral, Daily         ENT ROS negative except as stated above.     Patient answers are not available for this visit.            Objective:      There were no vitals filed for this visit.    General: NAD, well appearing  Eyes: Normal conjunctiva and lids  Face: symmetric, nerve intact  Nose: The nose is without any evidence of any deformity. The nasal mucosa is moist. The septum is midline. There is no evidence of septal hematoma. The turbinates are without abnormality.   Ears: The ears are with normal-appearing pinna. Ear canals narrow. Has lots of seborrhea in both ears. Wax obstructing bl.   Mouth: No obvious abnormalities to the lips. The teeth are unremarkable. The gingivae are without any obvious evidence of infection or lesion. The oral mucosa is moist and pink.  There are no obvious masses to the hard or soft palate.   Oropharynx: The uvula is midline.  The tongue is midline. The posterior pharynx is without erythema or exudate. The tonsils are normal appearing.  Salivary glands: The salivary glands are symmetric and not enlarged, no masses  Neck: No lymphadenopathy, trachea midline, thryoid not enlarged.  Psych: Normal mood and affect.   Neuro: Grossly intact  Speech: fluent    Procedure:   Cerumen impaction removal  Indications: Cerumen impaction  Verbal consent obtained.   Under microscope, wax was removed from right and left ear using combination of suction, hook, curette instrumentation.   Patient tolerated procedure well.          Assessment and Plan:       1. Otitis externa, unspecified chronicity, unspecified laterality, unspecified type    2. Bilateral impacted cerumen    3. Otalgia, bilateral          OE left, cerumen BL  Ciprodex BID 10 days to left, can put in right if still painful  Can try peroxide and rubbing alcohol to ears at home for wax    RTC: 3 months for ear cleaning or sooner if needed    Plan of care was discussed in detail with the patient, who agreed with the plan as above. All questions were answered in detail.     Annabella Price MD  Otolaryngology

## 2024-07-10 ENCOUNTER — TELEPHONE (OUTPATIENT)
Dept: FAMILY MEDICINE | Facility: CLINIC | Age: 59
End: 2024-07-10
Payer: MEDICARE

## 2024-07-10 NOTE — TELEPHONE ENCOUNTER
----- Message from Madison Sol sent at 7/10/2024 11:19 AM CDT -----  Contact: Family  Type:  Needs Medical Advice    Who Called: Family    Would the patient rather a call back or a response via [facility]? Fax    Best Call Back Number: [phone]     Additional Information: Patient's family is asking hat the list of meds be faxed o Dariana at [facility] again. Please advise

## 2024-07-24 ENCOUNTER — OFFICE VISIT (OUTPATIENT)
Dept: UROLOGY | Facility: CLINIC | Age: 59
End: 2024-07-24
Payer: MEDICARE

## 2024-07-24 ENCOUNTER — OFFICE VISIT (OUTPATIENT)
Dept: PSYCHIATRY | Facility: CLINIC | Age: 59
End: 2024-07-24
Payer: MEDICARE

## 2024-07-24 VITALS — WEIGHT: 200.19 LBS | BODY MASS INDEX: 36.84 KG/M2 | HEIGHT: 62 IN

## 2024-07-24 VITALS
HEART RATE: 81 BPM | HEIGHT: 62 IN | WEIGHT: 197.31 LBS | DIASTOLIC BLOOD PRESSURE: 87 MMHG | BODY MASS INDEX: 36.31 KG/M2 | SYSTOLIC BLOOD PRESSURE: 137 MMHG

## 2024-07-24 DIAGNOSIS — F51.04 PSYCHOPHYSIOLOGICAL INSOMNIA: ICD-10-CM

## 2024-07-24 DIAGNOSIS — F33.0 MDD (MAJOR DEPRESSIVE DISORDER), RECURRENT EPISODE, MILD: Primary | ICD-10-CM

## 2024-07-24 DIAGNOSIS — F41.1 GAD (GENERALIZED ANXIETY DISORDER): ICD-10-CM

## 2024-07-24 DIAGNOSIS — R39.15 URINARY URGENCY: ICD-10-CM

## 2024-07-24 DIAGNOSIS — N39.41 URGE INCONTINENCE OF URINE: Primary | ICD-10-CM

## 2024-07-24 LAB
BILIRUBIN, UA POC OHS: NEGATIVE
BLOOD, UA POC OHS: NEGATIVE
CLARITY, UA POC OHS: CLEAR
COLOR, UA POC OHS: YELLOW
GLUCOSE, UA POC OHS: NEGATIVE
KETONES, UA POC OHS: NEGATIVE
LEUKOCYTES, UA POC OHS: NEGATIVE
NITRITE, UA POC OHS: NEGATIVE
PH, UA POC OHS: 6.5
POC RESIDUAL URINE VOLUME: 6 ML (ref 0–100)
PROTEIN, UA POC OHS: NEGATIVE
SPECIFIC GRAVITY, UA POC OHS: 1.01
UROBILINOGEN, UA POC OHS: 0.2

## 2024-07-24 PROCEDURE — 99999PBSHW POCT URINALYSIS(INSTRUMENT): Mod: PBBFAC,,,

## 2024-07-24 PROCEDURE — 99214 OFFICE O/P EST MOD 30 MIN: CPT | Mod: S$PBB,,,

## 2024-07-24 PROCEDURE — 99999PBSHW POCT BLADDER SCAN: Mod: PBBFAC,,,

## 2024-07-24 PROCEDURE — 99999PBSHW PR PBB SHADOW TECHNICAL ONLY FILED TO HB: Mod: PBBFAC,,,

## 2024-07-24 PROCEDURE — 99213 OFFICE O/P EST LOW 20 MIN: CPT | Mod: PBBFAC,27,PO

## 2024-07-24 PROCEDURE — 81003 URINALYSIS AUTO W/O SCOPE: CPT | Mod: PBBFAC,PO

## 2024-07-24 PROCEDURE — 99214 OFFICE O/P EST MOD 30 MIN: CPT | Mod: PBBFAC,PO

## 2024-07-24 PROCEDURE — 51798 US URINE CAPACITY MEASURE: CPT | Mod: PBBFAC,PO

## 2024-07-24 PROCEDURE — 99213 OFFICE O/P EST LOW 20 MIN: CPT | Mod: S$PBB,,,

## 2024-07-24 PROCEDURE — 87086 URINE CULTURE/COLONY COUNT: CPT

## 2024-07-24 PROCEDURE — 99999 PR PBB SHADOW E&M-EST. PATIENT-LVL IV: CPT | Mod: PBBFAC,,,

## 2024-07-24 PROCEDURE — 99999 PR PBB SHADOW E&M-EST. PATIENT-LVL III: CPT | Mod: PBBFAC,,,

## 2024-07-24 RX ORDER — VENLAFAXINE HYDROCHLORIDE 75 MG/1
225 CAPSULE, EXTENDED RELEASE ORAL DAILY
Qty: 90 CAPSULE | Refills: 1 | Status: SHIPPED | OUTPATIENT
Start: 2024-07-24 | End: 2024-09-22

## 2024-07-24 RX ORDER — SOLIFENACIN SUCCINATE 5 MG/1
5 TABLET, FILM COATED ORAL DAILY
Qty: 30 TABLET | Refills: 11 | Status: SHIPPED | OUTPATIENT
Start: 2024-07-24 | End: 2025-07-24

## 2024-07-24 NOTE — PROGRESS NOTES
Outpatient Psychiatry Follow-Up Visit    Clinical Status of Patient: Outpatient (Ambulatory)  07/24/2024     Chief Complaint: Pt is a 59 y.o. female who presents today for a follow-up. Met with patient.       Interval History and Content of Current Session:  Interim Events/Subjective Report/Content of Current Session:  follow up appointment.    Pt is a 59 y.o. female with past psychiatric hx of MDD (major depressive disorder), recurrent episode, mild, MARLEN (generalized anxiety disorder) who presents for follow up treatment.     Past Psychiatric hx:   Pt. is a 60 yo F with a past psychiatric hx of Anxiety presenting to the clinic for an initial evaluation and treatment. Past medical history outlined below. She is currently taking busPIRone (BUSPAR), FLUoxetine, ALPRAZolam (XANAX), prescribed and managed by Dr. Banerjee. She reports that these medications have not addressed her anxiety.     6/26/24: Pt presents to OP Psychiatry for routine follow-up for treatment/medication management of MDD, MARLEN. Pt/family reports weight gain since starting Abilify, discussed decreasing dose to 2 mg, pt/family in agreement. Pt started taking Trazodone again per sister as her sleep worsened after dc of Xanax. Pt reports increased anxiety and irritability since dc of Xanax, discussed restarting at lower dose, pt/family in agreement. Pt uses eye drops and takes allergy medicine, reports watery eyes. Discussed with family if symptoms do not resolve to contact PCP. Pt/family verbalize understanding of medication changes. Pt denies SI/HI/AVH, self-harm, plan, or intent. Pt verbalizes understanding of medication instructions through teach back. No other issues, concerns, or problems, will reassess symptoms and medications in 30 days or PRN if symptoms worsen.     Past Medical hx: No past medical history on file.     Interim hx:  Medication changes last visit:     Continue venlafaxine (EFFEXOR-XR) 150 MG 24 hr capsule - Take 1 capsule (150 mg  total) by mouth once daily for anxiety and depression.  Discussed potential for GI side effects, sexual dysfunction, mood destabilization, headaches.  2.   Decrease ARIPiprazole (ABILIFY) 2 MG Tab - Take 1 tablet (2 mg total) by mouth once daily for depression/mood (adjunct). Typical ANAYA's reviewed including weight gain, abnormal movements, EPS, TD, metabolic side effects.  3.   Restart ALPRAZolam (XANAX) 0.25 MG tablet - Take 1 tablet (0.25 mg total) by mouth 2 (two) times daily as needed for Anxiety. Discussed risk of decreased RT, sedation, addictive potential, and not to mix with alcohol.   4.   Continue traZODone (DESYREL) 50 MG tablet - Take 1 tablet (50 mg total) by mouth every evening for insomnia.    Anxiety: Manageable  Depression: Same, no improvement  Sleep: Improved, restful  Appetite: Same, no improvement     Denies suicidal/homicidal ideations.  Denies hopelessness/worthlessness.    Denies auditory/visual hallucinations.      Tobacco:  denies  Alcohol:  denies  Drug use:  denies  Caffeine:  2 daily      Review of Systems   PSYCHIATRIC: Pertinent items are noted in the narrative.        CONSTITUTIONAL: weight stable        M/S: no pain today         ENT: no allergies noted today        ABD: no n/v/d     Past Medical, Family and Social History: The patient's past medical, family and social history have been reviewed and updated as appropriate within the electronic medical record. See encounter notes.     Medication Compliance: yes      Side effects: tolerates     Risk Parameters:  Patient reports no suicidal ideation  Patient reports no homicidal ideation  Patient reports no self-injurious behavior  Patient reports no violent behavior     Exam (detailed: at least 9 elements; comprehensive: all 15 elements)   Constitutional  Vitals:  Most recent vital signs, dated less than 90 days prior to this appointment, were reviewed. BP: ()/()   Arterial Line BP: ()/()     General:  unremarkable, age appropriate,  casual attire, good eye contact, good rapport       Musculoskeletal  Muscle Strength/Tone:  no flaccidity, no tremor    Gait & Station:  normal      Psychiatric                       Speech:  normal tone, normal rate, rhythm, and volume   Mood & Affect:   Euthymic, congruent, appropriate         Thought Process:   Goal directed; Linear    Associations:   intact   Thought Content:   No SI/HI, delusions, or paranoia, no AV/VH   Insight & Judgement:   Good, adequate to circumstances   Orientation:   grossly intact; alert and oriented x 4    Memory:  intact for content of interview    Language:  grossly intact, can repeat    Attention Span  : Grossly intact for content of interview   Fund of Knowledge:   intact and appropriate to age and level of education        Assessment and Diagnosis   Status/Progress: Based on the examination today, the patient's problem(s) is/are under fair control.  New problems have not been presented today. Comorbidities are not currently complicating management of the primary condition.      Impression:  Pt presents to OP Psychiatry for routine follow-up for treatment/medication management of MDD, MARLEN. Pt reports depression still prevalent, and hunger has not subsided with decrease in Abilify. Family interested in weaning off Abilify. Discussed doing that and increasing Effexor to help resolve symptoms of depression. 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 This has increased her sadness. Pt reports she is sleeping well. Pt taking Xanax sparingly for anxiety. Pt denies SI/HI/AVH, self-harm, plan, or intent. Pt verbalizes understanding of medication instructions through teach back. No other issues, concerns, or problems, will reassess symptoms and medications in 30 days or PRN if symptoms worsen.      Diagnosis:   - MDD (major depressive disorder), recurrent episode, mild  - MARLEN (generalized anxiety disorder)  -  Psychophysiological insomnia      Intervention/Counseling/Treatment Plan   Medication Management:      Increase venlafaxine (EFFEXOR-XR) 75 MG 24 hr capsule - Take 3 capsules (2250 mg total) by mouth once daily for anxiety and depression.  Discussed potential for GI side effects, sexual dysfunction, mood destabilization, headaches.      2.   Discontinue ARIPiprazole (ABILIFY) 2 MG Tab.     3.   Continue ALPRAZolam (XANAX) 0.25 MG tablet - Take 1 tablet (0.25 mg total) by mouth 2 (two) times daily as needed for Anxiety. Discussed risk of decreased RT, sedation, addictive potential, and not to mix with alcohol.      4.   Continue traZODone (DESYREL) 50 MG tablet - Take 1 tablet (50 mg total) by mouth every evening as needed for insomnia.     5.   Call to report any worsening of symptoms or problems with the medication. Pt instructed to go to ER with thoughts of harming self, others.     6.   Patient given contact # for psychotherapists at [facility] and also instructed she may check with insurance for list of providers.          Labs: none         Return to clinic:      30 days or PRN if symptoms worsen    -Spent 30min face to face with the pt; >50% time spent in counseling   -Supportive therapy and psychoeducation provided  -R/B/SE's of medications discussed with the pt who expresses understanding and chooses to take medications as prescribed.   -Pt instructed to call clinic, 911 or go to nearest emergency room if sxs worsen or pt is in   crisis. The pt expresses understanding.      Debbi Nunez NP  Psychiatric-Mental Health Nurse Practitioner  Department of Psychiatry    [facility] [place] [address] [place] [address]  Phone:  [phone]  Fax: [phone]

## 2024-07-24 NOTE — PATIENT INSTRUCTIONS
Increase venlafaxine (EFFEXOR-XR) 75 MG 24 hr capsule - Take 3 capsules (225 mg total) by mouth once daily for anxiety and depression.       2.   Discontinue ARIPiprazole (ABILIFY) 2 MG Tab.     3.   Continue ALPRAZolam (XANAX) 0.25 MG tablet - Take 1 tablet (0.25 mg total) by mouth 2 (two) times daily as needed for Anxiety. Discussed risk of decreased RT, sedation, addictive potential, and not to mix with alcohol.      4.   Continue traZODone (DESYREL) 50 MG tablet - Take 1 tablet (50 mg total) by mouth every evening as needed for insomnia.

## 2024-07-24 NOTE — PROGRESS NOTES
[facility] Note  Staff: Merary Romero FNP-C    PCP: MD Lavonne    Chief Complaint: Urinary Urgency    Subjective:        HPI: Rebeca Martinez is a 59 y.o. female NEW PATIENT presents today for evaluation of urinary urgency. She is accompanied by a female caretaker. Her caretaker states they have noticed an increase in urgency and states she often leaks urine before she can make it to the bathroom. The patient states she urinates every hour and multiple times during the night. She denies dysuria, hematuria, fever, flank pain, abd pain, and difficulty urinating. She denies a history of kidney stones. She denies constipation.     Questions asked pt during office visit today:  DTF: every hour, NTF: 3-4 x night  Urgency:Yes , incontinence with urgency? Yes ;   Incontinence with laughing or straining: No;   DysuriaNo  Gross HematuriaNo  History of UTI: No    History of Kidney Stones?:  No    Constipation issues?:  No    PVR by bladder scan performed by MA today:  6 mL    REVIEW OF SYSTEMS:  Review of Systems   Unable to perform ROS: Mental acuity   Constitutional: Negative.  Negative for chills and fever.   Gastrointestinal:  Negative for abdominal pain, constipation, diarrhea, nausea and vomiting.   Genitourinary:  Positive for frequency and urgency. Negative for dysuria, flank pain and hematuria.   Musculoskeletal: Negative.  Negative for back pain.       PMHx:  History reviewed. No pertinent past medical history.    PSHx:  Past Surgical History:   Procedure Laterality Date    APPENDECTOMY      CHOLECYSTECTOMY      COLONOSCOPY N/A 12/27/2023    Procedure: COLONOSCOPY;  Surgeon: Nile Huynh Jr., MD;  Location: [facility];  Service: Endoscopy;  Laterality: N/A;       Fam Hx:   malignancies: No , gyn malignancies: No   kidney stones: No     Soc Hx:  Lives in [place]    Allergies:  Patient has no known allergies.    Medications: reviewed     Objective:   There were no vitals filed for  this visit.    Physical Exam  HENT:      Head: Normocephalic.      Mouth/Throat:      Mouth: Mucous membranes are dry.   Eyes:      Conjunctiva/sclera: Conjunctivae normal.   Pulmonary:      Effort: Pulmonary effort is normal.   Abdominal:      General: There is no distension.      Palpations: Abdomen is soft.      Tenderness: There is no abdominal tenderness.   Skin:     General: Skin is warm.         LABS REVIEW:  UA today:   Color:Clear, Yellow  Spec. Grav.  1.010  PH  6.5  Negative for leukocytes, nitrates, protein, glucose, ketones, urobili, bili, and blood.    Assessment:       1. Urge incontinence of urine    2. Urinary urgency          Plan:      Urine sent for culture  Vesicare 5mg prescribed to pt today as trial to see if med improves pt's current LUTS.  Benefits, risks and side affects were thoroughly explained to pt today in office with all questions answered.  Discussed conservative measures to control urgency and frequency including avoiding bladder irritants, timed voiding, not postponing voiding, and bowel regimen (as distended bowel has extrinsic compressive effect on bladder. Discussed bladder irritants include coffe (even decaf), tea, alcohol, soda, spicy foods, acidic juices (orange, tomato), vinegar, and artificial sweeteners.  Retroperitoneal US ordered and scheduled    F/u 6-8 weeks with PVR    [facility]: Active    HARLAN Sung

## 2024-07-25 RX ORDER — FUROSEMIDE 20 MG/1
20 TABLET ORAL DAILY PRN
Qty: 90 TABLET | Refills: 1 | Status: SHIPPED | OUTPATIENT
Start: 2024-07-25

## 2024-07-25 NOTE — TELEPHONE ENCOUNTER
Refill Routing Note   Medication(s) are not appropriate for processing by [facility] for the following reason(s):        New or recently adjusted medication    [facility] action(s):  Defer               Appointments  past 12m or future 3m with PCP    Date Provider   Last Visit   6/6/2024 Zakia Banerjee MD   Next Visit   12/17/2024 Zakia Banerjee MD   ED visits in past 90 days: 1        Note composed:12:14 PM 07/25/2024

## 2024-07-25 NOTE — TELEPHONE ENCOUNTER
No care due was identified.  [facility] Embedded Care Due Messages. Reference number: [id number].   7/25/2024 5:45:34 AM CDT

## 2024-07-26 ENCOUNTER — TELEPHONE (OUTPATIENT)
Dept: UROLOGY | Facility: CLINIC | Age: 59
End: 2024-07-26
Payer: MEDICARE

## 2024-07-26 LAB
BACTERIA UR CULT: NORMAL
BACTERIA UR CULT: NORMAL

## 2024-07-28 DIAGNOSIS — F41.1 GAD (GENERALIZED ANXIETY DISORDER): ICD-10-CM

## 2024-07-29 RX ORDER — ALPRAZOLAM 0.25 MG/1
0.25 TABLET ORAL 2 TIMES DAILY PRN
Qty: 60 TABLET | Refills: 0 | Status: SHIPPED | OUTPATIENT
Start: 2024-07-29 | End: 2024-08-28

## 2024-07-31 ENCOUNTER — HOSPITAL ENCOUNTER (OUTPATIENT)
Dept: RADIOLOGY | Facility: HOSPITAL | Age: 59
Discharge: HOME | End: 2024-07-31
Payer: MEDICARE

## 2024-07-31 DIAGNOSIS — N39.41 URGE INCONTINENCE OF URINE: ICD-10-CM

## 2024-07-31 DIAGNOSIS — R39.15 URINARY URGENCY: ICD-10-CM

## 2024-07-31 PROCEDURE — 76770 US EXAM ABDO BACK WALL COMP: CPT | Mod: 26,,, | Performed by: RADIOLOGY

## 2024-07-31 PROCEDURE — 76770 US EXAM ABDO BACK WALL COMP: CPT | Mod: TC,PO

## 2024-08-06 ENCOUNTER — TELEPHONE (OUTPATIENT)
Dept: UROLOGY | Facility: CLINIC | Age: 59
End: 2024-08-06
Payer: MEDICARE

## 2024-08-07 ENCOUNTER — OFFICE VISIT (OUTPATIENT)
Dept: OTOLARYNGOLOGY | Facility: CLINIC | Age: 59
End: 2024-08-07
Payer: MEDICARE

## 2024-08-07 VITALS — BODY MASS INDEX: 36.44 KG/M2 | HEIGHT: 62 IN | WEIGHT: 198 LBS

## 2024-08-07 DIAGNOSIS — H61.23 BILATERAL IMPACTED CERUMEN: Primary | ICD-10-CM

## 2024-08-07 DIAGNOSIS — H60.313 ACUTE DIFFUSE OTITIS EXTERNA OF BOTH EARS: ICD-10-CM

## 2024-08-07 PROCEDURE — 99213 OFFICE O/P EST LOW 20 MIN: CPT | Mod: 25,S$PBB,, | Performed by: NURSE PRACTITIONER

## 2024-08-07 PROCEDURE — 69210 REMOVE IMPACTED EAR WAX UNI: CPT | Mod: S$PBB,,, | Performed by: NURSE PRACTITIONER

## 2024-08-07 PROCEDURE — 99999 PR PBB SHADOW E&M-EST. PATIENT-LVL III: CPT | Mod: PBBFAC,,, | Performed by: NURSE PRACTITIONER

## 2024-08-07 PROCEDURE — 99213 OFFICE O/P EST LOW 20 MIN: CPT | Mod: PBBFAC,PO | Performed by: NURSE PRACTITIONER

## 2024-08-07 PROCEDURE — 69210 REMOVE IMPACTED EAR WAX UNI: CPT | Mod: 50,PBBFAC,PO | Performed by: NURSE PRACTITIONER

## 2024-08-12 ENCOUNTER — OFFICE VISIT (OUTPATIENT)
Dept: FAMILY MEDICINE | Facility: CLINIC | Age: 59
End: 2024-08-12
Payer: MEDICARE

## 2024-08-12 ENCOUNTER — TELEPHONE (OUTPATIENT)
Dept: FAMILY MEDICINE | Facility: CLINIC | Age: 59
End: 2024-08-12
Payer: MEDICARE

## 2024-08-12 VITALS
SYSTOLIC BLOOD PRESSURE: 138 MMHG | DIASTOLIC BLOOD PRESSURE: 82 MMHG | HEART RATE: 87 BPM | OXYGEN SATURATION: 95 % | HEIGHT: 62 IN | BODY MASS INDEX: 36.47 KG/M2 | WEIGHT: 198.19 LBS

## 2024-08-12 DIAGNOSIS — Z99.89 DEPENDENCE ON OTHER ENABLING MACHINES AND DEVICES: ICD-10-CM

## 2024-08-12 DIAGNOSIS — Z00.00 ENCOUNTER FOR MEDICARE ANNUAL WELLNESS EXAM: Primary | ICD-10-CM

## 2024-08-12 DIAGNOSIS — E66.09 CLASS 1 OBESITY DUE TO EXCESS CALORIES WITHOUT SERIOUS COMORBIDITY WITH BODY MASS INDEX (BMI) OF 34.0 TO 34.9 IN ADULT: ICD-10-CM

## 2024-08-12 DIAGNOSIS — F41.1 GAD (GENERALIZED ANXIETY DISORDER): ICD-10-CM

## 2024-08-12 DIAGNOSIS — F33.0 MDD (MAJOR DEPRESSIVE DISORDER), RECURRENT EPISODE, MILD: ICD-10-CM

## 2024-08-12 DIAGNOSIS — R26.9 ABNORMALITY OF GAIT AND MOBILITY: ICD-10-CM

## 2024-08-12 PROCEDURE — G0439 PPPS, SUBSEQ VISIT: HCPCS | Mod: ,,, | Performed by: NURSE PRACTITIONER

## 2024-08-12 PROCEDURE — 99214 OFFICE O/P EST MOD 30 MIN: CPT | Mod: PBBFAC,PO | Performed by: NURSE PRACTITIONER

## 2024-08-12 PROCEDURE — 99999 PR PBB SHADOW E&M-EST. PATIENT-LVL IV: CPT | Mod: PBBFAC,,, | Performed by: NURSE PRACTITIONER

## 2024-08-12 NOTE — TELEPHONE ENCOUNTER
----- Message from Chayito Loya sent at 8/12/2024  3:10 PM CDT -----  Type:  RX Refill Request    Who Called:  pts sister   Refill or New Rx:  refill   RX Name and Strength:  topiramate (TOPAMAX) 50 MG tablet   How is the patient currently taking it? (ex. 1XDay):  as directed   Preferred Pharmacy with phone number:    [id number] - [place] [address] [place] [address] [address] [place] [address]  Phone: [phone] Fax: [phone]  Local or Mail Order:  local   Ordering Provider:  Lavonne Leary Call Back Number:  [phone]  Additional Information:  pts sister stated she needs a refill on pts rx listed above sent to pharm listed above please ensure to call back to advise with status update asap thanks!

## 2024-08-12 NOTE — PATIENT INSTRUCTIONS
Counseling and Referral of Other Preventative  (Italic type indicates deductible and co-insurance are waived)    Patient Name: Rebeca Martinez  Today's Date: 8/12/2024    Health Maintenance       Date Due Completion Date    Hepatitis C Screening Never done ---    Pneumococcal Vaccines (Age 0-64) (1 of 2 - PCV) Never done ---    HIV Screening Never done ---    Shingles Vaccine (1 of 2) Never done ---    Mammogram 08/23/2023 8/23/2022    COVID-19 Vaccine (4 - 2023-24 season) 09/01/2023 12/2/2021    Influenza Vaccine (1) 09/01/2024 2/27/2024 (Declined)    Override on 2/27/2024: Declined    Lipid Panel 04/19/2026 4/19/2021    Hemoglobin A1c (Diabetic Prevention Screening) 07/12/2026 7/12/2023    Cervical Cancer Screening 03/28/2028 1/23/2024    Colorectal Cancer Screening 12/27/2033 12/27/2023    TETANUS VACCINE 05/10/2034 5/10/2024        No orders of the defined types were placed in this encounter.    The following information is provided to all patients.  This information is to help you find resources for any of the problems found today that may be affecting your health:                  Living healthy guide: www.[facility].[place].gov      Understanding Diabetes: www.diabetes.org      Eating healthy: www.cdc.gov/healthyweight      CDC home safety checklist: www.cdc.gov/steadi/patient.html      Agency on Aging: www.goea.[place].[facility]      Alcoholics anonymous (AA): www.aa.org      Physical Activity: www.kelley.nih.gov/xb5pxgc      Tobacco use: www.quitwithusla.org

## 2024-08-13 NOTE — PROGRESS NOTES
"  Rebeca Martinez presented for an initial Medicare AWV today. The following components were reviewed and updated:    Medical history  Family History  Social history  Allergies and Current Medications  Health Risk Assessment  Health Maintenance  Care Team    **See Completed Assessments for Annual Wellness visit with in the encounter summary    The following assessments were completed:  Depression Screening  Cognitive function Screening    Timed Get Up Test  Whisper Test      Opioid documentation:      Patient does not have a current opioid prescription.          Vitals:    08/12/24 0942   BP: 138/82   BP Location: Left arm   Patient Position: Sitting   BP Method: Medium (Manual)   Pulse: 87   SpO2: 95%   Weight: 89.9 kg (198 lb 3.1 oz)   Height: 5' 2" (1.575 m)     Body mass index is 36.25 kg/m².       Physical Exam  Vitals reviewed.   Constitutional:       General: She is not in acute distress.  HENT:      Head: Normocephalic.   Cardiovascular:      Rate and Rhythm: Normal rate.   Pulmonary:      Effort: Pulmonary effort is normal. No respiratory distress.   Psychiatric:         Behavior: Behavior is not aggressive or withdrawn. Behavior is cooperative.         Cognition and Memory: Cognition is impaired.           Diagnoses and health risks identified today and associated recommendations/orders:  1. Encounter for Medicare annual wellness exam  Reviewed health maintenance and provided recommendations   Recommend PCV20  Mammogram scheduled for 9/4/24     2. MDD (major depressive disorder), recurrent episode, mild  Continue to monitor  Followed by Sandy Nunez  Venlafaxine 225 mg    3. MARLEN (generalized anxiety disorder)  Continue to monitor  Followed by Larisa Nunez.      4. Class 1 obesity due to excess calories without serious comorbidity with body mass index (BMI) of 34.0 to 34.9 in adult  Continue to monitor  Followed by Zakia Banerjee MD   Encourage healthy food chocies.      5. Dependence on other enabling machines " and devices  Continue to monitor  Followed by Zakia Banerjee MD .      6. Abnormality of gait and mobility  Continue to monitor  Followed by Zakia Banerjee MD   Utilize walker as needed.        Provided Rebeca with a 5-10 year written screening schedule and personal prevention plan. Recommendations were developed using the USPSTF age appropriate recommendations. Education, counseling, and referrals were provided as needed.  After Visit Summary printed and given to patient which includes a list of additional screenings\tests needed.    No follow-ups on file.      Yumiko Olguin NP

## 2024-08-14 RX ORDER — TOPIRAMATE 50 MG/1
50 TABLET, FILM COATED ORAL 2 TIMES DAILY
Qty: 60 TABLET | Refills: 11 | Status: SHIPPED | OUTPATIENT
Start: 2024-08-14

## 2024-08-15 ENCOUNTER — TELEPHONE (OUTPATIENT)
Dept: OTOLARYNGOLOGY | Facility: CLINIC | Age: 59
End: 2024-08-15
Payer: MEDICARE

## 2024-08-15 NOTE — TELEPHONE ENCOUNTER
I spoke with pt sister.  Not sure since not better wanting to know if PO antibiotics would benefit her .

## 2024-08-15 NOTE — TELEPHONE ENCOUNTER
----- Message from Lizeth Almendarez sent at 8/15/2024 12:52 PM CDT -----  Contact: SISTER/Jethro  Type: Needs Medical Advice  Who Called:  Kamille/sister  Symptoms (please be specific):  ear pain left   How long has patient had these symptoms:  over a week  Pharmacy name and phone #:    [facility] #[id number] - [place] [address] [address] [facility] [address]  [address] [place] [id number]  Phone: [phone] Fax: [phone]    Best Call Back Number: [phone]  Additional Information: sister states Rebeca is still saying ear hurts they were in earlier and got ear drops but pain is still  there and was wondering if they may have some antibiotics please

## 2024-08-19 NOTE — PROGRESS NOTES
Outpatient Psychiatry Follow-Up Visit    Clinical Status of Patient: Outpatient (Ambulatory)  08/23/2024     Chief Complaint: Pt is a 59 y.o. female who presents today for a follow-up. Met with patient.       Interval History and Content of Current Session:  Interim Events/Subjective Report/Content of Current Session:  follow up appointment.    Pt is a 59 y.o. female with past psychiatric hx of MDD (major depressive disorder), recurrent episode, mild, MARLEN (generalized anxiety disorder), Psychophysiological insomnia who presents for follow up treatment.     Past Psychiatric hx:   Pt. is a 58 yo F with a past psychiatric hx of Anxiety presenting to the clinic for an initial evaluation and treatment. Past medical history outlined below. She is currently taking busPIRone (BUSPAR), FLUoxetine, ALPRAZolam (XANAX), prescribed and managed by Dr. Banerjee. She reports that these medications have not addressed her anxiety.     7/24/24: Pt presents to OP Psychiatry for routine follow-up for treatment/medication management of MDD, MARLEN. Pt reports depression still prevalent, and hunger has not subsided with decrease in Abilify. Family interested in weaning off Abilify. Discussed doing that and increasing Effexor to help resolve symptoms of depression. 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 This has increased her sadness. Pt reports she is sleeping well. Pt taking Xanax sparingly for anxiety. Pt denies SI/HI/AVH, self-harm, plan, or intent. Pt verbalizes understanding of medication instructions through teach back. No other issues, concerns, or problems, will reassess symptoms and medications in 30 days or PRN if symptoms worsen.     Past Medical hx: History reviewed. No pertinent past medical history.     Interim hx:  Medication changes last visit:     Increase venlafaxine (EFFEXOR-XR) 75 MG 24 hr capsule - Take 3 capsules (225 mg total) by mouth  once daily for anxiety and depression.  Discussed potential for GI side effects, sexual dysfunction, mood destabilization, headaches.  2.   Discontinue ARIPiprazole (ABILIFY) 2 MG Tab.  3.   Continue ALPRAZolam (XANAX) 0.25 MG tablet - Take 1 tablet (0.25 mg total) by mouth 2 (two) times daily as needed for Anxiety. Discussed risk of decreased RT, sedation, addictive potential, and not to mix with alcohol.   4.   Continue traZODone (DESYREL) 50 MG tablet - Take 1 tablet (50 mg total) by mouth every evening as needed for insomnia.    Anxiety: Improved  Depression: Improved  Sleep: Improved  Appetite: Same, no improvement     Denies suicidal/homicidal ideations.  Denies hopelessness/worthlessness.    Denies auditory/visual hallucinations.      Tobacco:  denies  Alcohol:  denies  Drug use:  denies  Caffeine:  denies (quit)      Review of Systems   PSYCHIATRIC: Pertinent items are noted in the narrative.        CONSTITUTIONAL: weight stable        M/S: no pain today         ENT: no allergies noted today        ABD: no n/v/d     Past Medical, Family and Social History: The patient's past medical, family and social history have been reviewed and updated as appropriate within the electronic medical record. See encounter notes.     Medication Compliance: yes      Side effects: tolerates     Risk Parameters:  Patient reports no suicidal ideation  Patient reports no homicidal ideation  Patient reports no self-injurious behavior  Patient reports no violent behavior     Exam (detailed: at least 9 elements; comprehensive: all 15 elements)   Constitutional  Vitals:  Most recent vital signs, dated less than 90 days prior to this appointment, were reviewed. Pulse:  [84]   BP: (114)/(82)     General:  unremarkable, age appropriate, casual attire, good eye contact, good rapport       Musculoskeletal  Muscle Strength/Tone:  no flaccidity, no tremor    Gait & Station:  normal      Psychiatric                       Speech:  normal tone,  normal rate, rhythm, and volume   Mood & Affect:   Euthymic, congruent, appropriate         Thought Process:   Goal directed; Linear    Associations:   intact   Thought Content:   No SI/HI, delusions, or paranoia, no AV/VH   Insight & Judgement:   Good, adequate to circumstances   Orientation:   grossly intact; alert and oriented x 4    Memory:  intact for content of interview    Language:  grossly intact, can repeat    Attention Span  : Grossly intact for content of interview   Fund of Knowledge:   intact and appropriate to age and level of education        Assessment and Diagnosis   Status/Progress: Based on the examination today, the patient's problem(s) is/are under fair control.  New problems have not been presented today. Comorbidities are not currently complicating management of the primary condition.      Impression:  Pt presents to OP Psychiatry for routine follow-up for treatment/medication management of MDD, MARLEN, Insomnia. Pt sister in law () states they have noticed a significant improvement overall in mood, depression, and anxiety. Pt c/o daytime drowsiness, discussed trying to switch the Effexor to bedtime to see if this resolves. Pt/caregiver in agreement and verbalizes understanding of switch. AVS printed for reference. Pt reports appetite is still increased even after dc of Abilify, family will monitor. Denies need for any other medication changes at this time. Pt denies SI/HI/AVH, self-harm, plan, or intent. Pt verbalizes understanding of medication instructions through teach back. No other issues, concerns, or problems, will reassess symptoms and medications in 6 months or PRN if symptoms worsen.      Diagnosis:   - MDD (major depressive disorder), recurrent episode, mild  - MARLEN (generalized anxiety disorder)  - Psychophysiological insomnia      Intervention/Counseling/Treatment Plan   Medication Management:      Switch venlafaxine (EFFEXOR-XR) 75 MG 24 hr capsule - Take 3  capsules (225 mg total) by mouth at bedtime for anxiety and depression.  Discussed potential for GI side effects, sexual dysfunction, mood destabilization, headaches.      2.   Continue traZODone (DESYREL) 50 MG tablet - Take 1 tablet (50 mg total) by mouth every evening as needed for insomnia.     3.   Continue ALPRAZolam (XANAX) 0.25 MG tablet - Take 1 tablet (0.25 mg total) by mouth 2 (two) times daily as needed for Anxiety. Discussed risk of decreased RT, sedation, addictive potential, and not to mix with alcohol.      4.   Patient given contact # for psychotherapists at [facility] and also instructed she may check with insurance for list of providers.      5.   Call to report any worsening of symptoms or problems with the medication. Pt instructed to go to ER with thoughts of harming self, others.                Labs: none         Return to clinic:      6 months or PRN if symptoms worsen    -Spent 30min face to face with the pt; >50% time spent in counseling   -Supportive therapy and psychoeducation provided  -R/B/SE's of medications discussed with the pt who expresses understanding and chooses to take medications as prescribed.   -Pt instructed to call clinic, 911 or go to nearest emergency room if sxs worsen or pt is in   crisis. The pt expresses understanding.      Debbi Nunez NP  Psychiatric-Mental Health Nurse Practitioner  Department of Psychiatry    [facility] [place] [address] [place] [address]  Phone:  [phone]  Fax: [phone]

## 2024-08-23 ENCOUNTER — OFFICE VISIT (OUTPATIENT)
Dept: PSYCHIATRY | Facility: CLINIC | Age: 59
End: 2024-08-23
Payer: MEDICARE

## 2024-08-23 VITALS
SYSTOLIC BLOOD PRESSURE: 114 MMHG | HEIGHT: 63 IN | BODY MASS INDEX: 34.73 KG/M2 | WEIGHT: 196 LBS | DIASTOLIC BLOOD PRESSURE: 82 MMHG | HEART RATE: 84 BPM

## 2024-08-23 DIAGNOSIS — F51.04 PSYCHOPHYSIOLOGICAL INSOMNIA: ICD-10-CM

## 2024-08-23 DIAGNOSIS — F33.0 MDD (MAJOR DEPRESSIVE DISORDER), RECURRENT EPISODE, MILD: Primary | ICD-10-CM

## 2024-08-23 DIAGNOSIS — F41.1 GAD (GENERALIZED ANXIETY DISORDER): ICD-10-CM

## 2024-08-23 PROCEDURE — 99999 PR PBB SHADOW E&M-EST. PATIENT-LVL IV: CPT | Mod: PBBFAC,,,

## 2024-08-23 PROCEDURE — 99214 OFFICE O/P EST MOD 30 MIN: CPT | Mod: PBBFAC,PO

## 2024-08-23 NOTE — PATIENT INSTRUCTIONS
Switch venlafaxine (EFFEXOR-XR) 75 MG 24 hr capsule - Take 3 capsules (225 mg total) by mouth at bedtime for anxiety and depression.        2.   Continue traZODone (DESYREL) 50 MG tablet - Take 1 tablet (50 mg total) by mouth every evening as needed for insomnia.     3.   Continue ALPRAZolam (XANAX) 0.25 MG tablet - Take 1 tablet (0.25 mg total) by mouth 2 (two) times daily as needed for Anxiety. Discussed risk of decreased RT, sedation, addictive potential, and not to mix with alcohol.

## 2024-08-26 ENCOUNTER — TELEPHONE (OUTPATIENT)
Dept: UROLOGY | Facility: CLINIC | Age: 59
End: 2024-08-26
Payer: MEDICARE

## 2024-08-26 NOTE — TELEPHONE ENCOUNTER
----- Message from Mahsa Wilburn sent at 8/26/2024  3:25 PM CDT -----  Contact: Kamille (sister)  Type:  Needs Medical Advice    Who Called:  Kamille    Would the patient rather a call back or a response via [facility]?  Call back    Best Call Back Number: [phone]    Additional Information:  is needing some information about any tests that were done during last visit    Please call to advise    Thanks

## 2024-09-22 DIAGNOSIS — F33.0 MDD (MAJOR DEPRESSIVE DISORDER), RECURRENT EPISODE, MILD: ICD-10-CM

## 2024-09-22 DIAGNOSIS — F41.1 GAD (GENERALIZED ANXIETY DISORDER): ICD-10-CM

## 2024-09-23 RX ORDER — VENLAFAXINE HYDROCHLORIDE 75 MG/1
150 CAPSULE, EXTENDED RELEASE ORAL EVERY MORNING
Qty: 180 CAPSULE | Refills: 0 | Status: SHIPPED | OUTPATIENT
Start: 2024-09-23 | End: 2024-12-22

## 2024-10-12 NOTE — TELEPHONE ENCOUNTER
Care Due:                  Date            Visit Type   Department     Provider  --------------------------------------------------------------------------------                                EP -                              [facility]  Last Visit: 06-      CARE ([facility])   ALLISON Banerjee                               -                              [facility]  Next Visit: 12-      CARE ([facility])   ALLISON Banerjee                                                            Last  Test          Frequency    Reason                     Performed    Due Date  --------------------------------------------------------------------------------    CMP.........  12 months..  furosemide...............  12- 12-    [facility] Embedded Care Due Messages. Reference number: [id number].   10/12/2024 1:58:47 PM CDT

## 2024-10-14 RX ORDER — BUTALBITAL, ACETAMINOPHEN AND CAFFEINE 50; 325; 40 MG/1; MG/1; MG/1
1 TABLET ORAL EVERY 6 HOURS PRN
Qty: 30 TABLET | Refills: 0 | Status: SHIPPED | OUTPATIENT
Start: 2024-10-14

## 2024-11-13 ENCOUNTER — TELEPHONE (OUTPATIENT)
Dept: FAMILY MEDICINE | Facility: CLINIC | Age: 59
End: 2024-11-13
Payer: MEDICARE

## 2024-11-13 NOTE — TELEPHONE ENCOUNTER
Called and left patient a voice message.  Provider will be out of the office on 12/17/24.  To contact the office to reschedule her appointment.  Or she can reschedule through her tomy.

## 2024-11-14 ENCOUNTER — OFFICE VISIT (OUTPATIENT)
Dept: OTOLARYNGOLOGY | Facility: CLINIC | Age: 59
End: 2024-11-14
Payer: MEDICARE

## 2024-11-14 VITALS — BODY MASS INDEX: 34.41 KG/M2 | WEIGHT: 194.25 LBS

## 2024-11-14 DIAGNOSIS — H61.23 BILATERAL IMPACTED CERUMEN: Primary | ICD-10-CM

## 2024-11-14 PROCEDURE — 99999 PR PBB SHADOW E&M-EST. PATIENT-LVL III: CPT | Mod: PBBFAC,,, | Performed by: NURSE PRACTITIONER

## 2024-11-14 PROCEDURE — 99213 OFFICE O/P EST LOW 20 MIN: CPT | Mod: PBBFAC,PO | Performed by: NURSE PRACTITIONER

## 2024-11-14 PROCEDURE — 69210 REMOVE IMPACTED EAR WAX UNI: CPT | Mod: 50,PBBFAC,PO | Performed by: NURSE PRACTITIONER

## 2024-11-14 NOTE — PROGRESS NOTES
Subjective     Patient ID: Rebeca Martinez is a 59 y.o. female.    Chief Complaint: Cerumen Impaction (3 mo cleaning)    HPI  Patient had ears cleaned on 08/07/2024. Returns today for same. C/o ears bothering her and family member states she has been talking louder than usual. Long-history of excessive cerumen production.     Review of Systems   Constitutional: Negative.    HENT:  Positive for ear pain.    Eyes: Negative.    Respiratory: Negative.     Cardiovascular: Negative.    Gastrointestinal: Negative.    Musculoskeletal: Negative.    Integumentary:  Negative.   Neurological: Negative.    Hematological: Negative.    Psychiatric/Behavioral: Negative.            Objective     Physical Exam  Vitals and nursing note reviewed.   Constitutional:       General: She is not in acute distress.     Appearance: She is well-developed. She is not ill-appearing.   HENT:      Head: Normocephalic and atraumatic.      Right Ear: Hearing, tympanic membrane, ear canal and external ear normal. No middle ear effusion. Tympanic membrane is not erythematous.      Left Ear: Hearing, tympanic membrane, ear canal and external ear normal.  No middle ear effusion. Tympanic membrane is not erythematous.      Nose: Nose normal.   Eyes:      General: Lids are normal. No scleral icterus.        Right eye: No discharge.         Left eye: No discharge.   Neck:      Trachea: Trachea normal. No tracheal deviation.   Cardiovascular:      Rate and Rhythm: Normal rate.   Pulmonary:      Effort: Pulmonary effort is normal. No respiratory distress.      Breath sounds: No stridor. No wheezing.   Musculoskeletal:         General: Normal range of motion.      Cervical back: Normal range of motion and neck supple.   Skin:     General: Skin is warm and dry.   Neurological:      Mental Status: She is alert and oriented to person, place, and time.      Coordination: Coordination normal.      Gait: Gait normal.   Psychiatric:         Attention and  Perception: Attention normal.         Mood and Affect: Mood normal.         Speech: Speech normal.         Behavior: Behavior normal. Behavior is cooperative.     SEPARATE PROCEDURE IN OFFICE:   Procedure: Removal of impacted cerumen, bilateral   Pre Procedure Diagnosis: Cerumen Impaction   Post Procedure Diagnosis: Cerumen Impaction   Verbal informed consent in regards to risk of trauma to ear canal, ear drum or hearing, discomfort during procedure and/or inability to remove cerumen impaction in one session or unforeseen events or complications.   No anesthesia.     Procedure in detail:   Ear canal visualized bilateral with appropriate size ear speculum utilizing Operating Head Binocular Otomicroscope   Utilizing the following:  lavage and suction cannula was used AU. The impacted cerumen of the ear canals was removed atraumatically. The TM and EAC were then inspected and found to be clear of wax. See description of TMs/EACs in PE above.   Complications: No   Condition: Improved/Good       Assessment and Plan     1. Bilateral impacted cerumen        Patient encouraged to return to clinic if symptoms worsen/persist and as needed for further ENT symptoms or concerns.            No follow-ups on file.

## 2024-12-03 ENCOUNTER — TELEPHONE (OUTPATIENT)
Dept: FAMILY MEDICINE | Facility: CLINIC | Age: 59
End: 2024-12-03
Payer: MEDICARE

## 2024-12-03 NOTE — TELEPHONE ENCOUNTER
----- Message from [employer] sent at 12/3/2024  8:54 AM CST -----  Type:  Sooner Appointment Request    Caller is requesting a sooner appointment.  Caller declined first available appointment listed below.  Caller will not accept being placed on the waitlist and is requesting a message be sent to doctor.    Name of Caller:  Sister. Kamille  When is the first available appointment?  January 8th  Symptoms:  6 month follow up  Would the patient rather a call back or a response via [facility]? Call back   Best Call Back Number:  [phone]  Additional Information:  pt original appt was for 12/17, dr blanton wont be in office. Got message to call to reschedule no available appt until Jan.8th. pt needs sooner appt pls aedvise

## 2024-12-03 NOTE — TELEPHONE ENCOUNTER
----- Message from [employer] sent at 12/3/2024  8:54 AM CST -----  Type:  Sooner Appointment Request    Caller is requesting a sooner appointment.  Caller declined first available appointment listed below.  Caller will not accept being placed on the waitlist and is requesting a message be sent to doctor.    Name of Caller:  Sister. Kamille  When is the first available appointment?  January 8th  Symptoms:  6 month follow up  Would the patient rather a call back or a response via [facility]? Call back   Best Call Back Number:  [phone]  Additional Information:  pt original appt was for 12/17, dr blanton wont be in office. Got message to call to reschedule no available appt until Jan.8th. pt needs sooner appt pls aedvise

## 2024-12-30 DIAGNOSIS — F41.1 GAD (GENERALIZED ANXIETY DISORDER): ICD-10-CM

## 2024-12-30 DIAGNOSIS — F33.0 MDD (MAJOR DEPRESSIVE DISORDER), RECURRENT EPISODE, MILD: ICD-10-CM

## 2024-12-30 RX ORDER — VENLAFAXINE HYDROCHLORIDE 75 MG/1
150 CAPSULE, EXTENDED RELEASE ORAL EVERY MORNING
Qty: 180 CAPSULE | Refills: 0 | Status: SHIPPED | OUTPATIENT
Start: 2024-12-30 | End: 2025-03-30

## 2025-01-07 ENCOUNTER — TELEPHONE (OUTPATIENT)
Dept: PODIATRY | Facility: CLINIC | Age: 60
End: 2025-01-07
Payer: MEDICARE

## 2025-01-07 ENCOUNTER — OFFICE VISIT (OUTPATIENT)
Dept: PODIATRY | Facility: CLINIC | Age: 60
End: 2025-01-07
Payer: MEDICARE

## 2025-01-07 VITALS — HEIGHT: 63 IN | BODY MASS INDEX: 34.42 KG/M2 | WEIGHT: 194.25 LBS

## 2025-01-07 DIAGNOSIS — B35.3 TINEA PEDIS OF BOTH FEET: Primary | ICD-10-CM

## 2025-01-07 DIAGNOSIS — B35.1 ONYCHOMYCOSIS DUE TO DERMATOPHYTE: ICD-10-CM

## 2025-01-07 PROCEDURE — 99203 OFFICE O/P NEW LOW 30 MIN: CPT | Mod: S$PBB,,, | Performed by: PODIATRIST

## 2025-01-07 PROCEDURE — 99213 OFFICE O/P EST LOW 20 MIN: CPT | Mod: PBBFAC,PO | Performed by: PODIATRIST

## 2025-01-07 PROCEDURE — 99999 PR PBB SHADOW E&M-EST. PATIENT-LVL III: CPT | Mod: PBBFAC,,, | Performed by: PODIATRIST

## 2025-01-07 RX ORDER — CETIRIZINE HYDROCHLORIDE 10 MG/1
10 TABLET ORAL
COMMUNITY
Start: 2024-12-30

## 2025-01-07 RX ORDER — KETOCONAZOLE 20 MG/G
CREAM TOPICAL DAILY
Qty: 60 G | Refills: 3 | Status: SHIPPED | OUTPATIENT
Start: 2025-01-07

## 2025-01-07 NOTE — PROGRESS NOTES
Subjective:      Patient ID: Rebeca Martinez is a 59 y.o. female.    Chief Complaint: Nail Problem    Rebeca is a 59 y.o. female who presents to the podiatry clinic  with complaint of left great toe that she picked off and got infected she went to the ER and has been on bactrim which has helped. She has tinea as well, was given a cream for this that they had been putting on. No other acute complaints.     Review of Systems   Constitutional: Negative for chills and fever.   Cardiovascular:  Negative for claudication and leg swelling.   Respiratory:  Negative for shortness of breath.    Skin:  Positive for nail changes. Negative for itching and rash.   Musculoskeletal:  Negative for muscle cramps, muscle weakness and myalgias.   Gastrointestinal:  Negative for nausea and vomiting.   Neurological:  Negative for focal weakness, loss of balance, numbness and paresthesias.           Objective:      Physical Exam  Constitutional:       General: She is not in acute distress.     Appearance: She is well-developed. She is not diaphoretic.   Cardiovascular:      Pulses:           Dorsalis pedis pulses are 2+ on the right side and 2+ on the left side.        Posterior tibial pulses are 2+ on the right side and 2+ on the left side.      Comments: < 3 sec capillary refill time to toes 1-5 bilateral. Toes and feet are warm to touch proximally with normal distal cooling b/l. There is some hair growth on the feet and toes b/l. There is no edema b/l. No spider veins or varicosities present b/l.     Musculoskeletal:      Comments: Equinus noted b/l ankles with < 10 deg DF noted. MMT 5/5 in DF/PF/Inv/Ev resistance with no reproduction of pain in any direction. Passive range of motion of ankle and pedal joints is painless b/l.     Skin:     General: Skin is warm and dry.      Coloration: Skin is not pale.      Findings: No abrasion, bruising, burn, ecchymosis, erythema, laceration, lesion, petechiae or rash.      Nails: There is no  clubbing.      Comments: Skin temperature, texture and turgor within normal limits.    Nails 1-5 right and 2-5 left are thick 3-4 mm and discolored with subungual debris, the left hallux nail is gone with no underlying ulcer or wound and no erythema or edema today    Dry scale with superficial flakes over an erythematous base between the toes and in a moccasin pattern without ulceration, drainage, pus, tracking, fluctuance, malodor, or cardinal signs infection.      Neurological:      Mental Status: She is alert and oriented to person, place, and time.      Sensory: No sensory deficit.      Motor: No tremor, atrophy or abnormal muscle tone.      Comments: Negative tinel sign bilateral.   Psychiatric:         Behavior: Behavior normal.               Assessment:       Encounter Diagnoses   Name Primary?    Tinea pedis of both feet Yes    Onychomycosis due to dermatophyte          Plan:       Rebeca was seen today for nail problem.    Diagnoses and all orders for this visit:    Tinea pedis of both feet  -     ketoconazole (NIZORAL) 2 % cream; Apply topically once daily.    Onychomycosis due to dermatophyte      I counseled the patient on her conditions, their implications and medical management.    Use the ketoconazole cream daily to the feet    Should have the nails trimmed every few weeks so that she does not pick at them, can go to a nail salon for this    Return NAGA Genao DPM

## 2025-01-07 NOTE — TELEPHONE ENCOUNTER
----- Message from Derick sent at 1/7/2025  8:16 AM CST -----  Contact: Kamille  Type:  Sooner Appointment Request    Caller is requesting a sooner appointment.  Caller declined first available appointment listed below.  Caller will not accept being placed on the waitlist and is requesting a message be sent to doctor.    Name of Caller:  Kamille - Sister  When is the first available appointment?  Jan 29  Symptoms:  ER Follow Up / Big Toenail Peeled off  Would the patient rather a call back or a response via [facility]? Call Back  Best Call Back Number:  [phone]  Additional Information:  Kamille is requesting a call back to see how soon the patient can be seen for her toe and er follow up

## 2025-01-27 DIAGNOSIS — F41.1 GAD (GENERALIZED ANXIETY DISORDER): ICD-10-CM

## 2025-01-27 DIAGNOSIS — F41.1 GAD (GENERALIZED ANXIETY DISORDER): Primary | ICD-10-CM

## 2025-01-27 RX ORDER — ARIPIPRAZOLE 2 MG/1
2 TABLET ORAL
Qty: 30 TABLET | Refills: 11 | Status: SHIPPED | OUTPATIENT
Start: 2025-01-27

## 2025-01-27 RX ORDER — ALPRAZOLAM 0.25 MG/1
0.25 TABLET ORAL 2 TIMES DAILY
Qty: 60 TABLET | OUTPATIENT
Start: 2025-01-27

## 2025-01-27 RX ORDER — ALPRAZOLAM 0.5 MG/1
0.5 TABLET ORAL 2 TIMES DAILY
Qty: 60 TABLET | Refills: 4 | Status: SHIPPED | OUTPATIENT
Start: 2025-01-27

## 2025-01-27 RX ORDER — FLUOXETINE HYDROCHLORIDE 40 MG/1
40 CAPSULE ORAL
Qty: 90 CAPSULE | Refills: 3 | Status: SHIPPED | OUTPATIENT
Start: 2025-01-27

## 2025-01-27 NOTE — TELEPHONE ENCOUNTER
No care due was identified.  [facility] Embedded Care Due Messages. Reference number: [id number].   1/27/2025 10:12:13 AM CST

## 2025-01-30 ENCOUNTER — TELEPHONE (OUTPATIENT)
Dept: OTOLARYNGOLOGY | Facility: CLINIC | Age: 60
End: 2025-01-30
Payer: MEDICARE

## 2025-01-30 NOTE — TELEPHONE ENCOUNTER
Spoke w pt caregiver and have pt scheduled to come in on Monday 2/3/25   @ 3:15 to see Sadie Fuchs for Procedural wax removal.

## 2025-01-30 NOTE — TELEPHONE ENCOUNTER
----- Message from [employer] sent at 1/30/2025  8:27 AM CST -----  Type:  Same Day Appointment Request    Caller is requesting a same day appointment.  Caller declined first available appointment listed below.    Name of Caller:Mounika Martinez sister in law care giver  When is the first available appointment?na  Symptoms:pain in ear, wax buildup  Best Call Back Number [phone]    Additional Information: Caregiver Mounika is asking to have an appt today. Pt is special needs and is very uncomfortable.  Please call back to advise, thank you!

## 2025-02-03 ENCOUNTER — PROCEDURE VISIT (OUTPATIENT)
Dept: OTOLARYNGOLOGY | Facility: CLINIC | Age: 60
End: 2025-02-03
Payer: MEDICARE

## 2025-02-03 VITALS — WEIGHT: 190.06 LBS | BODY MASS INDEX: 33.66 KG/M2

## 2025-02-03 DIAGNOSIS — H61.23 BILATERAL IMPACTED CERUMEN: Primary | ICD-10-CM

## 2025-02-03 PROCEDURE — 69209 REMOVE IMPACTED EAR WAX UNI: CPT | Mod: 50,PBBFAC,PO | Performed by: NURSE PRACTITIONER

## 2025-02-03 PROCEDURE — 99499 UNLISTED E&M SERVICE: CPT | Mod: 25,S$PBB,, | Performed by: NURSE PRACTITIONER

## 2025-02-03 PROCEDURE — 69209 REMOVE IMPACTED EAR WAX UNI: CPT | Mod: 50,S$PBB,, | Performed by: NURSE PRACTITIONER

## 2025-02-03 NOTE — PROCEDURES
Ear Cerumen Removal    Date/Time: 2/3/2025 3:15 PM    Performed by: Sadie Fuchs NP  Authorized by: Sadie Fuchs NP    Consent Done?:  Not Needed    Local anesthetic:  None  Location details:  Both ears  Procedure type: irrigation    Procedure type comment:  Suction cannula (7 Brazilian)  Cerumen  Removal Results:  Cerumen completely removed  Patient tolerance:  Patient tolerated the procedure well with no immediate complications

## 2025-02-27 ENCOUNTER — OFFICE VISIT (OUTPATIENT)
Dept: FAMILY MEDICINE | Facility: CLINIC | Age: 60
End: 2025-02-27
Payer: MEDICARE

## 2025-02-27 VITALS
HEART RATE: 84 BPM | OXYGEN SATURATION: 97 % | BODY MASS INDEX: 33.78 KG/M2 | SYSTOLIC BLOOD PRESSURE: 130 MMHG | DIASTOLIC BLOOD PRESSURE: 82 MMHG | WEIGHT: 190.69 LBS

## 2025-02-27 DIAGNOSIS — F33.0 MDD (MAJOR DEPRESSIVE DISORDER), RECURRENT EPISODE, MILD: ICD-10-CM

## 2025-02-27 DIAGNOSIS — E78.9 LIPID DISORDER: ICD-10-CM

## 2025-02-27 DIAGNOSIS — F41.1 GAD (GENERALIZED ANXIETY DISORDER): Primary | ICD-10-CM

## 2025-02-27 DIAGNOSIS — M48.8X6 OTHER SPECIFIED SPONDYLOPATHIES, LUMBAR REGION: ICD-10-CM

## 2025-02-27 PROCEDURE — 99213 OFFICE O/P EST LOW 20 MIN: CPT | Mod: PBBFAC,PO | Performed by: INTERNAL MEDICINE

## 2025-02-27 PROCEDURE — 99214 OFFICE O/P EST MOD 30 MIN: CPT | Mod: S$PBB,,, | Performed by: INTERNAL MEDICINE

## 2025-02-27 PROCEDURE — 99999 PR PBB SHADOW E&M-EST. PATIENT-LVL III: CPT | Mod: PBBFAC,,, | Performed by: INTERNAL MEDICINE

## 2025-02-27 NOTE — PROGRESS NOTES
Subjective     Patient ID: Rebeca Martinez is a 60 y.o. female.    Chief Complaint: Annual Exam    Here for check up    Depression stable on meds  Insomani stable  Hyperlipideami on crestor    Review of Systems   Constitutional:  Negative for fever.   Respiratory:  Negative for shortness of breath.    Cardiovascular:  Negative for chest pain.   Neurological:  Negative for headaches.          Objective     Physical Exam  Constitutional:       Appearance: Normal appearance.   HENT:      Head: Normocephalic.   Cardiovascular:      Rate and Rhythm: Normal rate and regular rhythm.   Pulmonary:      Effort: Pulmonary effort is normal.      Breath sounds: Normal breath sounds.   Musculoskeletal:         General: Normal range of motion.      Cervical back: Normal range of motion.   Neurological:      General: No focal deficit present.      Mental Status: She is alert.   Psychiatric:         Mood and Affect: Mood normal.         Behavior: Behavior normal.            Assessment and Plan     1. MARLEN (generalized anxiety disorder)    2. Other specified spondylopathies, lumbar region    3. MDD (major depressive disorder), recurrent episode, mild    4. Lipid disorder        All chronic conditions stable, doing well. Will get labs next visit. Declines mammo. Encouraged walking 2-3 days a week         No follow-ups on file.

## 2025-03-30 NOTE — TELEPHONE ENCOUNTER
No care due was identified.  [facility] Embedded Care Due Messages. Reference number: [id number].   3/30/2025 8:46:43 AM CDT

## 2025-03-31 RX ORDER — BUTALBITAL, ACETAMINOPHEN AND CAFFEINE 50; 325; 40 MG/1; MG/1; MG/1
1 TABLET ORAL EVERY 6 HOURS PRN
Qty: 30 TABLET | Refills: 0 | Status: SHIPPED | OUTPATIENT
Start: 2025-03-31

## 2025-05-18 DIAGNOSIS — N39.41 URGE INCONTINENCE OF URINE: ICD-10-CM

## 2025-05-18 DIAGNOSIS — R39.15 URINARY URGENCY: ICD-10-CM

## 2025-05-19 ENCOUNTER — PROCEDURE VISIT (OUTPATIENT)
Dept: OTOLARYNGOLOGY | Facility: CLINIC | Age: 60
End: 2025-05-19
Payer: MEDICARE

## 2025-05-19 VITALS — BODY MASS INDEX: 33.78 KG/M2 | WEIGHT: 190.69 LBS

## 2025-05-19 DIAGNOSIS — H61.23 BILATERAL IMPACTED CERUMEN: ICD-10-CM

## 2025-05-19 DIAGNOSIS — H60.8X3 CHRONIC ECZEMATOID OTITIS EXTERNA OF BOTH EARS: Primary | ICD-10-CM

## 2025-05-19 PROCEDURE — 69210 REMOVE IMPACTED EAR WAX UNI: CPT | Mod: S$PBB,,, | Performed by: NURSE PRACTITIONER

## 2025-05-19 PROCEDURE — 99213 OFFICE O/P EST LOW 20 MIN: CPT | Mod: 25,S$PBB,, | Performed by: NURSE PRACTITIONER

## 2025-05-19 PROCEDURE — 69210 REMOVE IMPACTED EAR WAX UNI: CPT | Mod: 50,PBBFAC,PO | Performed by: NURSE PRACTITIONER

## 2025-05-19 RX ORDER — SOLIFENACIN SUCCINATE 5 MG/1
5 TABLET, FILM COATED ORAL
Qty: 30 TABLET | Refills: 11 | Status: SHIPPED | OUTPATIENT
Start: 2025-05-19

## 2025-05-19 RX ORDER — FLUOCINOLONE ACETONIDE 0.11 MG/ML
3 OIL AURICULAR (OTIC) DAILY PRN
Qty: 20 ML | Refills: 3 | Status: SHIPPED | OUTPATIENT
Start: 2025-05-19 | End: 2026-05-19

## 2025-05-19 NOTE — PROGRESS NOTES
Subjective     Patient ID: Rebeca Martinez is a 60 y.o. female.    Chief Complaint: Cerumen Impaction (Ear cleaning, with slight ear pain)    HPI  Patient returns every 3 months for ear cleaning. She produces copious cerumen and has chronically inflamed ear canal skin AU. No gtts. Her sister-in-law states that when she starts talking too loud, they know that it is time to have her ear cleaned.     Review of Systems   Constitutional: Negative.    HENT: Negative.     Eyes: Negative.    Respiratory: Negative.     Cardiovascular: Negative.    Gastrointestinal: Negative.    Musculoskeletal: Negative.    Integumentary:  Negative.   Neurological: Negative.    Hematological: Negative.    Psychiatric/Behavioral: Negative.            Objective     Physical Exam  Vitals and nursing note reviewed.   Constitutional:       General: She is not in acute distress.     Appearance: She is well-developed. She is not ill-appearing.   HENT:      Head: Normocephalic and atraumatic.      Right Ear: Hearing and external ear normal.      Left Ear: Hearing and external ear normal.      Nose: Nose normal.   Pulmonary:      Effort: Pulmonary effort is normal. No respiratory distress.   Musculoskeletal:         General: Normal range of motion.   Neurological:      Mental Status: She is alert and oriented to person, place, and time.      Coordination: Coordination normal.      Gait: Gait normal.   Psychiatric:         Attention and Perception: Attention normal.         Mood and Affect: Mood normal.         Speech: Speech normal.         Behavior: Behavior normal.       SEPARATE PROCEDURE IN OFFICE:   Procedure: Removal of impacted cerumen, bilateral   Pre Procedure Diagnosis: Cerumen Impaction   Post Procedure Diagnosis: Cerumen Impaction   Verbal informed consent in regards to risk of trauma to ear canal, ear drum or hearing, discomfort during procedure and/or inability to remove cerumen impaction in one session or unforeseen events or  complications.   No anesthesia.     Procedure in detail:   Ear canal visualized bilateral with appropriate size ear speculum utilizing Operating Head Binocular Otomicroscope   Utilizing the following: suction cannula was used. The impacted cerumen of the ear canals was removed atraumatically. The TM and EAC were then inspected and found to be clear of wax. See description of TMs/EACs in PE above.   Complications: No   Condition: Improved/Good       Assessment and Plan     1. Chronic eczematoid otitis externa of both ears  -     fluocinolone acetonide oiL (DERMOTIC OIL) 0.01 % Drop; Place 3 drops in ear(s) daily as needed (itching).  Dispense: 20 mL; Refill: 3    2. Bilateral impacted cerumen        DermOtic gtts prn  Patient encouraged to return to clinic if symptoms worsen/persist and as needed for further ENT symptoms or concerns.            No follow-ups on file.

## 2025-05-29 ENCOUNTER — OFFICE VISIT (OUTPATIENT)
Dept: FAMILY MEDICINE | Facility: CLINIC | Age: 60
End: 2025-05-29
Payer: MEDICARE

## 2025-05-29 VITALS
BODY MASS INDEX: 35.09 KG/M2 | HEIGHT: 62 IN | DIASTOLIC BLOOD PRESSURE: 98 MMHG | WEIGHT: 190.69 LBS | OXYGEN SATURATION: 97 % | SYSTOLIC BLOOD PRESSURE: 130 MMHG | HEART RATE: 80 BPM

## 2025-05-29 DIAGNOSIS — F41.1 GAD (GENERALIZED ANXIETY DISORDER): ICD-10-CM

## 2025-05-29 DIAGNOSIS — I10 HYPERTENSION, UNSPECIFIED TYPE: ICD-10-CM

## 2025-05-29 DIAGNOSIS — E78.9 LIPID DISORDER: ICD-10-CM

## 2025-05-29 DIAGNOSIS — F33.0 MDD (MAJOR DEPRESSIVE DISORDER), RECURRENT EPISODE, MILD: Primary | ICD-10-CM

## 2025-05-29 PROCEDURE — 99999 PR PBB SHADOW E&M-EST. PATIENT-LVL III: CPT | Mod: PBBFAC,,, | Performed by: INTERNAL MEDICINE

## 2025-05-29 PROCEDURE — 99213 OFFICE O/P EST LOW 20 MIN: CPT | Mod: PBBFAC,PO | Performed by: INTERNAL MEDICINE

## 2025-05-29 PROCEDURE — 99214 OFFICE O/P EST MOD 30 MIN: CPT | Mod: S$PBB,,, | Performed by: INTERNAL MEDICINE

## 2025-05-29 RX ORDER — LOSARTAN POTASSIUM 50 MG/1
50 TABLET ORAL DAILY
Qty: 90 TABLET | Refills: 3 | Status: SHIPPED | OUTPATIENT
Start: 2025-05-29 | End: 2026-05-29

## 2025-05-29 NOTE — PROGRESS NOTES
Subjective     Patient ID: Rebeca Martinez is a 60 y.o. female.    Chief Complaint: miscellaneous (Patient is here today to have paperwork filled out. She is with her caregiver today who is her sister-in-law)    Pt here for check up.      Anxiety satble on effexor, following with psych  Htn not on med, not checking bp at home  Hyperlipidemia stable on crestor     Review of Systems   Constitutional:  Negative for fever.   Respiratory:  Negative for shortness of breath.    Cardiovascular:  Negative for chest pain.   Neurological:  Negative for headaches.          Objective     Physical Exam  Constitutional:       Appearance: Normal appearance.   HENT:      Head: Normocephalic.   Cardiovascular:      Rate and Rhythm: Normal rate and regular rhythm.   Pulmonary:      Effort: Pulmonary effort is normal.      Breath sounds: Normal breath sounds.   Musculoskeletal:         General: Normal range of motion.      Cervical back: Normal range of motion.   Neurological:      General: No focal deficit present.      Mental Status: She is alert.   Psychiatric:         Mood and Affect: Mood normal.         Behavior: Behavior normal.            Assessment and Plan     1. MDD (major depressive disorder), recurrent episode, mild    2. MARLEN (generalized anxiety disorder)    3. Lipid disorder    4. Hypertension, unspecified type    Other orders  -     losartan (COZAAR) 50 MG tablet; Take 1 tablet (50 mg total) by mouth once daily.  Dispense: 90 tablet; Refill: 3        Htn start losartan  Anxiety stable  High cholesterol stable  Filled out ppw         No follow-ups on file.

## 2025-06-24 RX ORDER — TRAZODONE HYDROCHLORIDE 50 MG/1
50 TABLET ORAL NIGHTLY
Qty: 90 TABLET | Refills: 3 | Status: SHIPPED | OUTPATIENT
Start: 2025-06-24

## 2025-06-24 NOTE — TELEPHONE ENCOUNTER
Refill Decision Note   Rebeca Juan  is requesting a refill authorization.  Brief Assessment and Rationale for Refill:  Approve     Medication Therapy Plan:        Comments:     Note composed:9:59 AM 06/24/2025

## 2025-06-24 NOTE — TELEPHONE ENCOUNTER
No care due was identified.  [facility] Embedded Care Due Messages. Reference number: [id number].   6/24/2025 5:41:00 AM CDT

## 2025-07-01 ENCOUNTER — TELEPHONE (OUTPATIENT)
Dept: FAMILY MEDICINE | Facility: CLINIC | Age: 60
End: 2025-07-01
Payer: MEDICARE

## 2025-07-01 DIAGNOSIS — K76.0 FATTY LIVER: Primary | ICD-10-CM

## 2025-07-01 NOTE — TELEPHONE ENCOUNTER
Spoke with patients sister in law and she is concerned about the patients liver. Would like to know if you could order an US and labs.

## 2025-07-01 NOTE — TELEPHONE ENCOUNTER
Copied from CRM #[id number]. Topic: General Inquiry - Patient Advice  >> Jul 1, 2025  1:35 PM Kayleigh wrote:  Type:  Needs Medical Advice    Who Called: Pt Sister in Law   Symptoms (please be specific): Swollen Abdomen / Pain   How long has patient had these symptoms: A week   Pharmacy name and phone #:    [facility] #[id number] - [address] AT [facility] [address] [address]  Phone: [phone] Fax: [phone]     Would the patient rather a call back or a response via [facility]? Call back  Best Call Back Number:  [phone]   Additional Information: Pt Sister in Law ( Care Giver ) would like to speak with someone in office please call back to advise

## 2025-07-15 ENCOUNTER — HOSPITAL ENCOUNTER (OUTPATIENT)
Dept: RADIOLOGY | Facility: HOSPITAL | Age: 60
Discharge: HOME | End: 2025-07-15
Attending: INTERNAL MEDICINE
Payer: MEDICARE

## 2025-07-15 DIAGNOSIS — K76.0 FATTY LIVER: ICD-10-CM

## 2025-07-15 PROCEDURE — 76705 ECHO EXAM OF ABDOMEN: CPT | Mod: TC,PO

## 2025-07-15 PROCEDURE — 76705 ECHO EXAM OF ABDOMEN: CPT | Mod: 26,,, | Performed by: RADIOLOGY

## 2025-07-16 ENCOUNTER — TELEPHONE (OUTPATIENT)
Dept: FAMILY MEDICINE | Facility: CLINIC | Age: 60
End: 2025-07-16
Payer: MEDICARE

## 2025-07-16 NOTE — TELEPHONE ENCOUNTER
Copied from CRM #[id number]. Topic: General Inquiry - Test Results  >> Jul 16, 2025  8:58 AM Gricel wrote:  Type:  Needs Medical Advice    Who Called: Mounika - sister in law  Symptoms (please be specific): needing to speak to nurse regarding her recent test results, needs to know the next steps,     Would the patient rather a call back or a response via [facility]? call  Best Call Back Number: [phone]    Additional Information: please advise and thank you.

## 2025-07-28 DIAGNOSIS — Z00.00 ENCOUNTER FOR MEDICARE ANNUAL WELLNESS EXAM: ICD-10-CM

## 2025-08-12 ENCOUNTER — PROCEDURE VISIT (OUTPATIENT)
Dept: OTOLARYNGOLOGY | Facility: CLINIC | Age: 60
End: 2025-08-12
Payer: MEDICARE

## 2025-08-12 VITALS — WEIGHT: 188.94 LBS | BODY MASS INDEX: 34.56 KG/M2

## 2025-08-12 DIAGNOSIS — H61.23 BILATERAL IMPACTED CERUMEN: Primary | ICD-10-CM

## 2025-08-12 PROCEDURE — 69209 REMOVE IMPACTED EAR WAX UNI: CPT | Mod: 50,S$PBB,, | Performed by: NURSE PRACTITIONER

## 2025-08-12 PROCEDURE — 69209 REMOVE IMPACTED EAR WAX UNI: CPT | Mod: 50,PBBFAC,PO | Performed by: NURSE PRACTITIONER

## 2025-08-12 PROCEDURE — 99499 UNLISTED E&M SERVICE: CPT | Mod: 25,S$PBB,, | Performed by: NURSE PRACTITIONER

## 2025-08-25 RX ORDER — TOPIRAMATE 50 MG/1
50 TABLET, FILM COATED ORAL 2 TIMES DAILY
Qty: 60 TABLET | Refills: 11 | Status: SHIPPED | OUTPATIENT
Start: 2025-08-25